# Patient Record
Sex: MALE | Race: OTHER | NOT HISPANIC OR LATINO | URBAN - METROPOLITAN AREA
[De-identification: names, ages, dates, MRNs, and addresses within clinical notes are randomized per-mention and may not be internally consistent; named-entity substitution may affect disease eponyms.]

---

## 2020-09-21 ENCOUNTER — EMERGENCY (EMERGENCY)
Facility: HOSPITAL | Age: 65
LOS: 1 days | Discharge: HOME | End: 2020-09-21
Attending: EMERGENCY MEDICINE
Payer: MEDICARE

## 2020-09-21 VITALS
HEIGHT: 68.5 IN | DIASTOLIC BLOOD PRESSURE: 80 MMHG | HEART RATE: 70 BPM | OXYGEN SATURATION: 97 % | RESPIRATION RATE: 16 BRPM | WEIGHT: 206.13 LBS | SYSTOLIC BLOOD PRESSURE: 118 MMHG | TEMPERATURE: 98 F

## 2020-09-21 VITALS
RESPIRATION RATE: 17 BRPM | DIASTOLIC BLOOD PRESSURE: 64 MMHG | SYSTOLIC BLOOD PRESSURE: 126 MMHG | TEMPERATURE: 98 F | HEART RATE: 57 BPM | OXYGEN SATURATION: 97 %

## 2020-09-21 LAB
ALBUMIN SERPL ELPH-MCNC: 3.7 G/DL — SIGNIFICANT CHANGE UP (ref 3.5–5)
ALP SERPL-CCNC: 48 U/L — SIGNIFICANT CHANGE UP (ref 40–120)
ALT FLD-CCNC: 26 U/L DA — SIGNIFICANT CHANGE UP (ref 10–60)
ANION GAP SERPL CALC-SCNC: 6 MMOL/L — SIGNIFICANT CHANGE UP (ref 5–17)
AST SERPL-CCNC: 23 U/L — SIGNIFICANT CHANGE UP (ref 10–40)
BASOPHILS # BLD AUTO: 0.04 K/UL — SIGNIFICANT CHANGE UP (ref 0–0.2)
BASOPHILS NFR BLD AUTO: 0.5 % — SIGNIFICANT CHANGE UP (ref 0–2)
BILIRUB SERPL-MCNC: 0.7 MG/DL — SIGNIFICANT CHANGE UP (ref 0.2–1.2)
BUN SERPL-MCNC: 25 MG/DL — HIGH (ref 7–18)
CALCIUM SERPL-MCNC: 9.4 MG/DL — SIGNIFICANT CHANGE UP (ref 8.4–10.5)
CHLORIDE SERPL-SCNC: 111 MMOL/L — HIGH (ref 96–108)
CO2 SERPL-SCNC: 25 MMOL/L — SIGNIFICANT CHANGE UP (ref 22–31)
CREAT SERPL-MCNC: 1.84 MG/DL — HIGH (ref 0.5–1.3)
EOSINOPHIL # BLD AUTO: 0.17 K/UL — SIGNIFICANT CHANGE UP (ref 0–0.5)
EOSINOPHIL NFR BLD AUTO: 2.3 % — SIGNIFICANT CHANGE UP (ref 0–6)
GLUCOSE SERPL-MCNC: 92 MG/DL — SIGNIFICANT CHANGE UP (ref 70–99)
HCT VFR BLD CALC: 39.6 % — SIGNIFICANT CHANGE UP (ref 39–50)
HGB BLD-MCNC: 12.9 G/DL — LOW (ref 13–17)
IMM GRANULOCYTES NFR BLD AUTO: 0.4 % — SIGNIFICANT CHANGE UP (ref 0–1.5)
LYMPHOCYTES # BLD AUTO: 2.04 K/UL — SIGNIFICANT CHANGE UP (ref 1–3.3)
LYMPHOCYTES # BLD AUTO: 27.2 % — SIGNIFICANT CHANGE UP (ref 13–44)
MAGNESIUM SERPL-MCNC: 2.3 MG/DL — SIGNIFICANT CHANGE UP (ref 1.6–2.6)
MCHC RBC-ENTMCNC: 26.9 PG — LOW (ref 27–34)
MCHC RBC-ENTMCNC: 32.6 GM/DL — SIGNIFICANT CHANGE UP (ref 32–36)
MCV RBC AUTO: 82.7 FL — SIGNIFICANT CHANGE UP (ref 80–100)
MONOCYTES # BLD AUTO: 0.63 K/UL — SIGNIFICANT CHANGE UP (ref 0–0.9)
MONOCYTES NFR BLD AUTO: 8.4 % — SIGNIFICANT CHANGE UP (ref 2–14)
NEUTROPHILS # BLD AUTO: 4.6 K/UL — SIGNIFICANT CHANGE UP (ref 1.8–7.4)
NEUTROPHILS NFR BLD AUTO: 61.2 % — SIGNIFICANT CHANGE UP (ref 43–77)
NRBC # BLD: 0 /100 WBCS — SIGNIFICANT CHANGE UP (ref 0–0)
PLATELET # BLD AUTO: 207 K/UL — SIGNIFICANT CHANGE UP (ref 150–400)
POTASSIUM SERPL-MCNC: 3.8 MMOL/L — SIGNIFICANT CHANGE UP (ref 3.5–5.3)
POTASSIUM SERPL-SCNC: 3.8 MMOL/L — SIGNIFICANT CHANGE UP (ref 3.5–5.3)
PROT SERPL-MCNC: 7.4 G/DL — SIGNIFICANT CHANGE UP (ref 6–8.3)
RBC # BLD: 4.79 M/UL — SIGNIFICANT CHANGE UP (ref 4.2–5.8)
RBC # FLD: 15 % — HIGH (ref 10.3–14.5)
SODIUM SERPL-SCNC: 142 MMOL/L — SIGNIFICANT CHANGE UP (ref 135–145)
WBC # BLD: 7.51 K/UL — SIGNIFICANT CHANGE UP (ref 3.8–10.5)
WBC # FLD AUTO: 7.51 K/UL — SIGNIFICANT CHANGE UP (ref 3.8–10.5)

## 2020-09-21 PROCEDURE — 70450 CT HEAD/BRAIN W/O DYE: CPT

## 2020-09-21 PROCEDURE — 99284 EMERGENCY DEPT VISIT MOD MDM: CPT

## 2020-09-21 PROCEDURE — 85025 COMPLETE CBC W/AUTO DIFF WBC: CPT

## 2020-09-21 PROCEDURE — 93005 ELECTROCARDIOGRAM TRACING: CPT

## 2020-09-21 PROCEDURE — 80053 COMPREHEN METABOLIC PANEL: CPT

## 2020-09-21 PROCEDURE — 99284 EMERGENCY DEPT VISIT MOD MDM: CPT | Mod: 25

## 2020-09-21 PROCEDURE — 83735 ASSAY OF MAGNESIUM: CPT

## 2020-09-21 PROCEDURE — 82962 GLUCOSE BLOOD TEST: CPT

## 2020-09-21 PROCEDURE — 36415 COLL VENOUS BLD VENIPUNCTURE: CPT

## 2020-09-21 NOTE — ED PROVIDER NOTE - CARE PROVIDER_API CALL
Chris Hunt  NEUROLOGY  [address] [facility], [facility] [address]  [place] [address]  Phone: [phone]  Fax: [phone]  Follow Up Time:

## 2020-09-21 NOTE — ED PROVIDER NOTE - CLINICAL SUMMARY MEDICAL DECISION MAKING FREE TEXT BOX
63 y/o male with chronic speech stuttering and upper extremity tremors sent by Dr. Bell. Will check labs and check with Dr. Bell.

## 2020-09-21 NOTE — ED PROVIDER NOTE - NSFOLLOWUPINSTRUCTIONS_ED_ALL_ED_FT
Followup with your PMD or neurology specialist above.  Return to ED if you develop seizures/lose consciousness or develop severe shaking.      Tremors    WHAT YOU NEED TO KNOW:    A tremor is a movement you cannot control that occurs in a rhythm. Tremors most commonly occur in the hands. Other common places include the head or face, trunk, or legs. Your voice can also have a tremor and sound shaky when you speak. A tremor may be caused by a nerve problem, too much thyroid hormone, or by certain medicines, caffeine, or alcohol. Tremors may be temporary or permanent. The tremor may go away and return, or worsen with stress. Tremors can happen at any age, but they are more common in later years.    DISCHARGE INSTRUCTIONS:    Contact your healthcare provider if:   •You have a new or worsening tremor.      •You have tremors that make it difficult to do your regular activities.      •You have questions or concerns about your condition or care.      Medicines:   •Medicines may be used to help control some kinds of tremors.      •Take your medicine as directed. Contact your healthcare provider if you think your medicine is not helping or if you have side effects. Tell him or her if you are allergic to any medicine. Keep a list of the medicines, vitamins, and herbs you take. Include the amounts, and when and why you take them. Bring the list or the pill bottles to follow-up visits. Carry your medicine list with you in case of an emergency.      Manage your symptoms:   •Do not have caffeine or other chemicals that affect your nerves. Limit or do not have caffeine. Caffeine can make tremors worse. Talk to your healthcare provider about herbal medicines, teas, and supplements. They may also increase tremors. Do not use illegal drugs.      •Go to physical and occupational therapy as directed. A physical therapist can teach you exercises to help reduce the tremor and improve muscle control. You may be shown how to hold the body part during a tremor to help control the movement. The therapist can also help you build strength and balance. An occupational therapist can show you how to use adaptive devices to help you move more easily.       •Use objects that will help you control movements. You may have more hand control if you add a watch or bracelet to your wrist. It may be easier for you to drink from a straw, or to fill your cup only half full. Cups with lids, such as travel mugs, can also help you drink with more control. Heavy eating utensils can help you eat more easily. A button fastener can help you button clothing if tremors in your hands make this difficult.      •Set a regular sleep schedule. Lack of sleep can make tremors worse. Try to go to bed at the same time each night and wake up at the same time each morning.      Follow up with your healthcare provider as directed: Write down your questions so you remember to ask them during your visits.

## 2020-09-21 NOTE — ED ADULT TRIAGE NOTE - CHIEF COMPLAINT QUOTE
I have stuttering for 2 weeks and left hand tremor x 3 years now. Patient has no comprehension deficit.

## 2020-09-21 NOTE — ED ADULT NURSE NOTE - OBJECTIVE STATEMENT
Pt states "I have stuttering for 2 weeks and left hand tremor x 3 years now". Patient has no comprehension deficit. Pt resting in bed, A&OX3, no facial droop noted. Pt denies chest pain, no shortness of breath indicated.

## 2020-09-21 NOTE — ED PROVIDER NOTE - PROGRESS NOTE DETAILS
D/w PMD Dr. Bell and he requests for Pt to have CT head.  He says that if CT head unremarkable, Pt may be discharged home to f/u him and neurology outpatient. patient signeodut to me by Dr. Beasley at 11pm. awaiting CT head  CT head unremarkable. Discussed above with patient. patient stable for discharge to followup with neurology as outpatient. EMILIA Badillo MD

## 2020-09-21 NOTE — ED ADULT TRIAGE NOTE - ARRIVAL INFO ADDITIONAL COMMENTS
Coherent, stuttering speech, left facial droop, no arm drift, left arm tremor, no sensory deficit, ambulates with steady gait, denies swallowing deficit

## 2020-09-21 NOTE — ED ADULT NURSE NOTE - ED STAT RN HANDOFF DETAILS
Report given to LENARD Crocker. Pt resting in bed. No acute distress noted, no shortness of breath indicated. Safety maintained.

## 2020-09-21 NOTE — ED PROVIDER NOTE - OBJECTIVE STATEMENT
65 y/o male h/o HTN, DM, and chronic back pain s/p trauma, coming in for 2 weeks of speech stuttering and 3 years of b/l upper extremity tremors, worse on left than right. States he saw PCP Dr. Olmstead earlier today who reportedly advised him to come to the ED for further evaluation. Denies any NV, chest pain, shortness of breath, fever, visual changes, syncope, covid sx, or any other pain.

## 2020-09-21 NOTE — ED PROVIDER NOTE - NSFOLLOWUPCLINICS_GEN_ALL_ED_FT
[facility]  Neurology  [address] [place] [address]  Phone: [phone]  Fax: [phone]  Follow Up Time:

## 2020-09-22 PROCEDURE — 70450 CT HEAD/BRAIN W/O DYE: CPT | Mod: 26

## 2020-09-24 ENCOUNTER — APPOINTMENT (OUTPATIENT)
Dept: NEUROLOGY | Facility: CLINIC | Age: 65
End: 2020-09-24
Payer: MEDICARE

## 2020-09-24 VITALS
HEIGHT: 72 IN | OXYGEN SATURATION: 97 % | WEIGHT: 208 LBS | HEART RATE: 69 BPM | BODY MASS INDEX: 28.17 KG/M2 | SYSTOLIC BLOOD PRESSURE: 121 MMHG | DIASTOLIC BLOOD PRESSURE: 74 MMHG

## 2020-09-24 DIAGNOSIS — Z78.9 OTHER SPECIFIED HEALTH STATUS: ICD-10-CM

## 2020-09-24 DIAGNOSIS — Z86.39 PERSONAL HISTORY OF OTHER ENDOCRINE, NUTRITIONAL AND METABOLIC DISEASE: ICD-10-CM

## 2020-09-24 DIAGNOSIS — I63.20: ICD-10-CM

## 2020-09-24 DIAGNOSIS — Z82.49 FAMILY HISTORY OF ISCHEMIC HEART DISEASE AND OTHER DISEASES OF THE CIRCULATORY SYSTEM: ICD-10-CM

## 2020-09-24 DIAGNOSIS — Z83.3 FAMILY HISTORY OF DIABETES MELLITUS: ICD-10-CM

## 2020-09-24 DIAGNOSIS — Z86.79 PERSONAL HISTORY OF OTHER DISEASES OF THE CIRCULATORY SYSTEM: ICD-10-CM

## 2020-09-24 DIAGNOSIS — G20 PARKINSON'S DISEASE: ICD-10-CM

## 2020-09-24 PROBLEM — Z00.00 ENCOUNTER FOR PREVENTIVE HEALTH EXAMINATION: Status: ACTIVE | Noted: 2020-09-24

## 2020-09-24 PROCEDURE — 99203 OFFICE O/P NEW LOW 30 MIN: CPT

## 2020-09-24 RX ORDER — REPAGLINIDE 2 MG/1
2 TABLET ORAL
Refills: 0 | Status: ACTIVE | COMMUNITY

## 2020-09-24 RX ORDER — SIMVASTATIN 20 MG/1
20 TABLET, FILM COATED ORAL
Refills: 0 | Status: ACTIVE | COMMUNITY

## 2020-09-24 RX ORDER — LABETALOL HYDROCHLORIDE 100 MG/1
100 TABLET, FILM COATED ORAL
Refills: 0 | Status: ACTIVE | COMMUNITY

## 2020-09-24 RX ORDER — FENOFIBRATE 48 MG/1
48 TABLET ORAL
Refills: 0 | Status: ACTIVE | COMMUNITY

## 2020-09-24 RX ORDER — FOSINOPRIL SODIUM 40 MG/1
40 TABLET ORAL
Refills: 0 | Status: ACTIVE | COMMUNITY

## 2020-09-24 RX ORDER — AMLODIPINE BESYLATE 5 MG/1
5 TABLET ORAL
Refills: 0 | Status: ACTIVE | COMMUNITY

## 2020-09-24 RX ORDER — GLIMEPIRIDE 1 MG/1
1 TABLET ORAL
Refills: 0 | Status: ACTIVE | COMMUNITY

## 2020-09-24 RX ORDER — HYDROCHLOROTHIAZIDE 12.5 MG/1
12.5 CAPSULE ORAL
Refills: 0 | Status: ACTIVE | COMMUNITY

## 2020-09-24 RX ORDER — ISOSORBIDE MONONITRATE 30 MG/1
30 TABLET, EXTENDED RELEASE ORAL
Refills: 0 | Status: ACTIVE | COMMUNITY

## 2020-09-24 RX ORDER — SITAGLIPTIN 100 MG/1
100 TABLET, FILM COATED ORAL
Refills: 0 | Status: ACTIVE | COMMUNITY

## 2020-09-24 NOTE — REVIEW OF SYSTEMS
[Hand Weakness] :  hand weakness [Poor Coordination] : poor coordination [Difficulty Writing] : difficulty writing [Difficulties in Speech] : speech difficulties [Fever] : no fever [Chills] : no chills [Feeling Poorly] : not feeling poorly [Feeling Tired] : not feeling tired [Recent Weight Gain (___ Lbs)] : no recent weight gain [Recent Weight Loss (___ Lbs)] : no recent weight loss [Suicidal] : not suicidal [Sleep Disturbances] : no sleep disturbances [Confused or Disoriented] : no confusion [Memory Lapses or Loss] : no memory loss [Decr. Concentrating Ability] : no decrease in concentrating ability [Difficulty with Language] : no ~M difficulty with language [Changed Thought Patterns] : no change in thought patterns [Repeating Questions] : no repeated questioning about recent events [Facial Weakness] : no facial weakness [Arm Weakness] : no arm weakness [Leg Weakness] : no leg weakness [Numbness] : no numbness [Tingling] : no tingling [Abnormal Sensation] : no abnormal sensation [Hypersensitivity] : no hypersensitivity [Seizures] : no convulsions [Dizziness] : no dizziness [Fainting] : no fainting [Lightheadedness] : no lightheadedness [Vertigo] : no vertigo [Migraine Headache] : no migraine headache [Difficulty Walking] : no difficulty walking [Inability to Walk] : able to walk [Ataxia] : no ataxia [Frequent Falls] : not falling [Limping] : not limping [Eye Pain] : no eye pain [Red Eyes] : eyes not red [Eyesight Problems] : no eyesight problems [Discharge From Eyes] : no purulent discharge from the eyes [Dry Eyes] : no dryness of the eyes [Eyes Itch] : no itching of the eyes

## 2020-09-24 NOTE — HISTORY OF PRESENT ILLNESS
[FreeTextEntry1] : Tremor and difficulty speaking. He developed  tremor in the left hand that impairs activities with the hand. He is left handed and the tremor occurs intermittently interrupting writing shaving buttoning and other tasks. The speech impairment described as a stammer has happened before about 3 years ago and appeared to improve spontaneously after a month. It has recurred a few days ago. He went for a CT scan two days ago

## 2020-09-24 NOTE — DISCUSSION/SUMMARY
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 It appeared to be a slightly faster frequency compared to usual PD tremor, with a large amplitude in his left hand only when it was held by his side. Quan cognitive assessment  performance was poor in only the tasks requiring language, and, because he is a native [place] speaker the value of this part of the score is questionable. 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 MRI and Janeen scan will help in resolve the diffierental

## 2020-09-24 NOTE — DATA REVIEWED
[de-identified] : EXAM: CT BRAIN \par PROCEDURE DATE: 09/22/2020 \par INTERPRETATION: CLINICAL INFORMATION: Bilateral upper extremity tremors and stuttering speech times several weeks. \par TECHNIQUE: Noncontrast axial CT images of the brain were acquired from the base of skull to vertex. \par COMPARISON: None. \par FINDINGS: No intracranial hemorrhage is seen. The grey-white differentiation is maintained. Mild periventricular and subcortical white matter hypoattenuation without mass effect is noted, non-specific, but likely related to chronic small vessel ischemic changes. \par Ventricles and sulci are normal in size and configuration for patient's age. No mass effect or midline shift is seen. Basal cisterns are not effaced. \par The visualized paranasal sinuses and tympanomastoid cavities are clear. Patient is edentulous. \par No osseous abnormality seen. \par IMPRESSION: No intracranial hemorrhage or mass effect. \par HERMES MARROQUIN M.D., ATTENDING RADIOLOGIST \par This document has been electronically signed. Sep 22 2020 1:05AM

## 2020-09-29 PROBLEM — I10 ESSENTIAL (PRIMARY) HYPERTENSION: Chronic | Status: ACTIVE | Noted: 2020-09-23

## 2020-09-29 PROBLEM — E11.9 TYPE 2 DIABETES MELLITUS WITHOUT COMPLICATIONS: Chronic | Status: ACTIVE | Noted: 2020-09-23

## 2020-09-29 PROBLEM — M54.9 DORSALGIA, UNSPECIFIED: Chronic | Status: ACTIVE | Noted: 2020-09-23

## 2020-10-03 ENCOUNTER — OUTPATIENT (OUTPATIENT)
Dept: OUTPATIENT SERVICES | Facility: HOSPITAL | Age: 65
LOS: 1 days | End: 2020-10-03

## 2020-10-03 ENCOUNTER — APPOINTMENT (OUTPATIENT)
Dept: MRI IMAGING | Facility: CLINIC | Age: 65
End: 2020-10-03
Payer: MEDICARE

## 2020-10-03 PROCEDURE — 70551 MRI BRAIN STEM W/O DYE: CPT | Mod: 26

## 2020-11-04 ENCOUNTER — APPOINTMENT (OUTPATIENT)
Dept: NUCLEAR MEDICINE | Facility: IMAGING CENTER | Age: 65
End: 2020-11-04

## 2020-11-04 ENCOUNTER — OUTPATIENT (OUTPATIENT)
Dept: OUTPATIENT SERVICES | Facility: HOSPITAL | Age: 65
LOS: 1 days | End: 2020-11-04
Payer: MEDICARE

## 2020-11-04 DIAGNOSIS — G20 PARKINSON'S DISEASE: ICD-10-CM

## 2020-11-04 DIAGNOSIS — I63.20 CEREBRAL INFARCTION DUE TO UNSPECIFIED OCCLUSION OR STENOSIS OF UNSPECIFIED PRECEREBRAL ARTERIES: ICD-10-CM

## 2020-11-04 PROCEDURE — 78803 RP LOCLZJ TUM SPECT 1 AREA: CPT | Mod: 26

## 2020-11-04 PROCEDURE — A9584: CPT

## 2020-11-04 PROCEDURE — 78803 RP LOCLZJ TUM SPECT 1 AREA: CPT

## 2020-11-19 ENCOUNTER — APPOINTMENT (OUTPATIENT)
Dept: NEUROLOGY | Facility: CLINIC | Age: 65
End: 2020-11-19
Payer: MEDICARE

## 2020-11-19 VITALS
HEART RATE: 69 BPM | WEIGHT: 208 LBS | OXYGEN SATURATION: 98 % | SYSTOLIC BLOOD PRESSURE: 117 MMHG | HEIGHT: 72 IN | TEMPERATURE: 97.6 F | BODY MASS INDEX: 28.17 KG/M2 | DIASTOLIC BLOOD PRESSURE: 65 MMHG

## 2020-11-19 DIAGNOSIS — G25.0 ESSENTIAL TREMOR: ICD-10-CM

## 2020-11-19 PROCEDURE — 99214 OFFICE O/P EST MOD 30 MIN: CPT

## 2020-12-03 NOTE — DISCUSSION/SUMMARY
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 He is encouraged to continue his current medications for control of diabetes mellitus, hyperlipidemia and hypertension.\par \par  Discussed and agreed to begin a trial of primidone for presumed essential tremor which is the diagnosis now that Parkinson's disease appears less likely (but not excluded) according to the DG scan.

## 2020-12-03 NOTE — PHYSICAL EXAM
[General Appearance - Alert] : alert [General Appearance - In No Acute Distress] : in no acute distress [Person] : oriented to person [Place] : oriented to place [Time] : oriented to time [Short Term Intact] : short term memory intact [Remote Intact] : remote memory intact [Span Intact] : the attention span was normal [Concentration Intact] : normal concentrating ability [Visual Intact] : visual attention was ~T not ~L decreased [Naming Objects] : no difficulty naming common objects [Repeating Phrases] : no difficulty repeating a phrase [Writing A Sentence] : no difficulty writing a sentence [Fluency] : fluency intact [Comprehension] : comprehension intact [Reading] : reading intact [Cranial Nerves Optic (II)] : visual acuity intact bilaterally,  visual fields full to confrontation, pupils equal round and reactive to light [Cranial Nerves Oculomotor (III)] : extraocular motion intact [Cranial Nerves Trigeminal (V)] : facial sensation intact symmetrically [Cranial Nerves Facial (VII)] : face symmetrical [Cranial Nerves Vestibulocochlear (VIII)] : hearing was intact bilaterally [Cranial Nerves Glossopharyngeal (IX)] : tongue and palate midline [Cranial Nerves Accessory (XI - Cranial And Spinal)] : head turning and shoulder shrug symmetric [Motor Tone] : muscle tone was normal in all four extremities [Motor Strength] : muscle strength was normal in all four extremities [Sensation Tactile Decrease] : light touch was intact [Vibration Decrease Leg / Foot Both Ankles] : decreased at both ankles [Vibration Decrease Leg / Foot Toes Both Feet] : decreased at the toes of both feet  [Limited Balance] : the patient's balance was impaired [2+] : Brachioradialis left 2+ [1+] : Ankle jerk left 1+ [Primitive Reflexes] : primitive reflexes were present [Sclera] : the sclera and conjunctiva were normal [PERRL With Normal Accommodation] : pupils were equal in size, round, reactive to light, with normal accommodation [Extraocular Movements] : extraocular movements were intact [Dysdiadochokinesia Bilaterally] : not present [Coordination - Dysmetria Impaired Finger-to-Nose Bilateral] : not present [Coordination - Dysmetria Impaired Heel-to-Shin Bilateral] : not present [Plantar Reflex Right Only] : normal on the right [Plantar Reflex Left Only] : normal on the left [___] : absent on the right [___] : absent on the left [FreeTextEntry1] : Resting tremor and dynamic /static slow rhythmic tremor left> right hand with stooped gait and reduced swing of the left hand

## 2020-12-03 NOTE — DATA REVIEWED
[de-identified] : EXAM: CT BRAIN \par PROCEDURE DATE: 09/22/2020 \par INTERPRETATION: CLINICAL INFORMATION: Bilateral upper extremity tremors and stuttering speech times several weeks. \par TECHNIQUE: Noncontrast axial CT images of the brain were acquired from the base of skull to vertex. \par COMPARISON: None. \par FINDINGS: No intracranial hemorrhage is seen. The grey-white differentiation is maintained. Mild periventricular and subcortical white matter hypoattenuation without mass effect is noted, non-specific, but likely related to chronic small vessel ischemic changes. \par Ventricles and sulci are normal in size and configuration for patient's age. No mass effect or midline shift is seen. Basal cisterns are not effaced. \par The visualized paranasal sinuses and tympanomastoid cavities are clear. Patient is edentulous. \par No osseous abnormality seen. \par IMPRESSION: No intracranial hemorrhage or mass effect. \par HERMES MARROQUIN M.D., ATTENDING RADIOLOGIST \par This document has been electronically signed. Sep 22 2020 1:05AM

## 2020-12-22 ENCOUNTER — APPOINTMENT (OUTPATIENT)
Dept: NEUROLOGY | Facility: CLINIC | Age: 65
End: 2020-12-22
Payer: MEDICARE

## 2020-12-22 VITALS
WEIGHT: 204 LBS | HEIGHT: 72 IN | BODY MASS INDEX: 27.63 KG/M2 | SYSTOLIC BLOOD PRESSURE: 131 MMHG | OXYGEN SATURATION: 96 % | HEART RATE: 65 BPM | DIASTOLIC BLOOD PRESSURE: 63 MMHG | TEMPERATURE: 97.9 F

## 2020-12-22 PROCEDURE — 99072 ADDL SUPL MATRL&STAF TM PHE: CPT

## 2020-12-22 PROCEDURE — 99214 OFFICE O/P EST MOD 30 MIN: CPT

## 2020-12-22 NOTE — HISTORY OF PRESENT ILLNESS
[FreeTextEntry1] : Although he reports that he is not better, he continues to take primidone 50 mg at bed time. He reports he had used primidone in the past and it had not worked.

## 2021-03-22 ENCOUNTER — APPOINTMENT (OUTPATIENT)
Dept: NEUROLOGY | Facility: CLINIC | Age: 66
End: 2021-03-22
Payer: MEDICARE

## 2021-03-22 VITALS
TEMPERATURE: 98.7 F | SYSTOLIC BLOOD PRESSURE: 139 MMHG | BODY MASS INDEX: 26.55 KG/M2 | HEIGHT: 72 IN | HEART RATE: 65 BPM | OXYGEN SATURATION: 97 % | DIASTOLIC BLOOD PRESSURE: 79 MMHG | WEIGHT: 196 LBS

## 2021-03-22 PROCEDURE — 99072 ADDL SUPL MATRL&STAF TM PHE: CPT

## 2021-03-22 PROCEDURE — 99214 OFFICE O/P EST MOD 30 MIN: CPT

## 2021-03-22 NOTE — PHYSICAL EXAM
[Person] : oriented to person [Place] : oriented to place [Time] : oriented to time [Short Term Intact] : short term memory intact [Remote Intact] : remote memory intact [Span Intact] : the attention span was normal [Concentration Intact] : normal concentrating ability [Visual Intact] : visual attention was ~T not ~L decreased [Naming Objects] : no difficulty naming common objects [Repeating Phrases] : no difficulty repeating a phrase [Writing A Sentence] : no difficulty writing a sentence [Fluency] : fluency intact [Comprehension] : comprehension intact [Reading] : reading intact [Cranial Nerves Optic (II)] : visual acuity intact bilaterally,  visual fields full to confrontation, pupils equal round and reactive to light [Cranial Nerves Oculomotor (III)] : extraocular motion intact [Cranial Nerves Trigeminal (V)] : facial sensation intact symmetrically [Cranial Nerves Facial (VII)] : face symmetrical [Cranial Nerves Vestibulocochlear (VIII)] : hearing was intact bilaterally [Cranial Nerves Glossopharyngeal (IX)] : tongue and palate midline [Cranial Nerves Accessory (XI - Cranial And Spinal)] : head turning and shoulder shrug symmetric [Motor Tone] : muscle tone was normal in all four extremities [Motor Strength] : muscle strength was normal in all four extremities [Sensation Tactile Decrease] : light touch was intact [Vibration Decrease Leg / Foot Both Ankles] : decreased at both ankles [Vibration Decrease Leg / Foot Toes Both Feet] : decreased at the toes of both feet  [Limited Balance] : the patient's balance was impaired [2+] : Brachioradialis left 2+ [1+] : Ankle jerk left 1+ [Primitive Reflexes] : primitive reflexes were present [Sclera] : the sclera and conjunctiva were normal [PERRL With Normal Accommodation] : pupils were equal in size, round, reactive to light, with normal accommodation [Extraocular Movements] : extraocular movements were intact [Dysdiadochokinesia Bilaterally] : not present [Coordination - Dysmetria Impaired Finger-to-Nose Bilateral] : not present [Coordination - Dysmetria Impaired Heel-to-Shin Bilateral] : not present [Plantar Reflex Right Only] : normal on the right [Plantar Reflex Left Only] : normal on the left [___] : absent on the right [___] : absent on the left [FreeTextEntry1] : Resting tremor and dynamic /static slow rhythmic tremor left> right hand with stooped gait and reduced swing of the left hand

## 2021-03-22 NOTE — DATA REVIEWED
[de-identified] : EXAM: NM BRAIN SPECT SA SD\par PROCEDURE DATE: 11/04/2020\par INTERPRETATION: RADIOPHARMACEUTICAL: 4.4 mCi I-123 Ioflupane, i.v.\par CLINICAL INFORMATION: 64 year-old male with movement disorder, referred to evaluate for essential tremor versus Parkinson's disease. The patient was not on any confounding medication at the time of the study.\par \par TECHNIQUE: The patient was pretreated with Lugol's solution 1 hour prior to administration of the above dose of radiopharmaceutical. SPECT imaging of the brain was performed approximately 3 hours after radiopharmaceutical injection.\par COMPARISON: No prior DaTSCAN was available for comparison.\par FINDINGS: There is symmetric activity in the caudate nuclei and putamina bilaterally.\par IMPRESSION: Normal DaTScan. No radionuclide evidence of dopaminergic transporter depletion. Findings do not support the diagnosis of Parkinson's disease or a Parkinson's syndrome.\par DENISE GUERRERO MD; Attending Nuclear Medicine\par This document has been electronically signed. Nov 4 2020 4:46PM\par \par

## 2021-03-22 NOTE — DISCUSSION/SUMMARY
[FreeTextEntry1] : Improved tremor; microvascular ischemic changes on MRI and normal DG scan;\par continue current dose of primidone that appears to be helping the tremor. Follow up after 1 year.

## 2021-03-22 NOTE — HISTORY OF PRESENT ILLNESS
[FreeTextEntry1] : Feels tremor is well- controlled he rates the shaking at its worst to 3/5 but admits it is > 50% better. Has a little shaking using forks an d knife and with bringing a cup of liquid to drink to his mouth, but fully independent with dressing laces shoes.

## 2022-03-22 ENCOUNTER — APPOINTMENT (OUTPATIENT)
Dept: NEUROLOGY | Facility: CLINIC | Age: 67
End: 2022-03-22
Payer: MEDICARE

## 2022-03-22 VITALS
HEART RATE: 67 BPM | BODY MASS INDEX: 26.55 KG/M2 | DIASTOLIC BLOOD PRESSURE: 69 MMHG | HEIGHT: 72 IN | OXYGEN SATURATION: 97 % | TEMPERATURE: 98.6 F | WEIGHT: 196 LBS | SYSTOLIC BLOOD PRESSURE: 122 MMHG

## 2022-03-22 DIAGNOSIS — G90.9 DISORDER OF THE AUTONOMIC NERVOUS SYSTEM, UNSPECIFIED: ICD-10-CM

## 2022-03-22 PROCEDURE — 95922 AUTONOMIC NRV ADRENRG INERVJ: CPT | Mod: 1L

## 2022-03-22 PROCEDURE — 99214 OFFICE O/P EST MOD 30 MIN: CPT

## 2022-04-14 ENCOUNTER — APPOINTMENT (OUTPATIENT)
Dept: NEUROLOGY | Facility: CLINIC | Age: 67
End: 2022-04-14

## 2022-04-14 VITALS
BODY MASS INDEX: 27.09 KG/M2 | WEIGHT: 200 LBS | SYSTOLIC BLOOD PRESSURE: 121 MMHG | HEART RATE: 66 BPM | OXYGEN SATURATION: 97 % | DIASTOLIC BLOOD PRESSURE: 72 MMHG | TEMPERATURE: 97.2 F | HEIGHT: 72 IN

## 2022-04-14 VITALS — HEIGHT: 70 IN | BODY MASS INDEX: 28.7 KG/M2

## 2022-04-14 PROCEDURE — 95923 AUTONOMIC NRV SYST FUNJ TEST: CPT

## 2022-04-14 PROCEDURE — 93922 UPR/L XTREMITY ART 2 LEVELS: CPT

## 2022-04-14 PROCEDURE — 95921 AUTONOMIC NRV PARASYM INERVJ: CPT

## 2022-04-17 NOTE — REVIEW OF SYSTEMS
[Hand Weakness] :  hand weakness [Poor Coordination] : poor coordination [Difficulty Writing] : difficulty writing [Difficulties in Speech] : speech difficulties [Negative] : Heme/Lymph [Fever] : no fever [Chills] : no chills [Feeling Poorly] : not feeling poorly [Feeling Tired] : not feeling tired [Recent Weight Gain (___ Lbs)] : no recent weight gain [Recent Weight Loss (___ Lbs)] : no recent weight loss [Suicidal] : not suicidal [Sleep Disturbances] : no sleep disturbances [Confused or Disoriented] : no confusion [Memory Lapses or Loss] : no memory loss [Decr. Concentrating Ability] : no decrease in concentrating ability [Difficulty with Language] : no ~M difficulty with language [Changed Thought Patterns] : no change in thought patterns [Repeating Questions] : no repeated questioning about recent events [Facial Weakness] : no facial weakness [Arm Weakness] : no arm weakness [Leg Weakness] : no leg weakness [Numbness] : no numbness [Tingling] : no tingling [Abnormal Sensation] : no abnormal sensation [Hypersensitivity] : no hypersensitivity [Seizures] : no convulsions [Dizziness] : no dizziness [Fainting] : no fainting [Lightheadedness] : no lightheadedness [Vertigo] : no vertigo [Migraine Headache] : no migraine headache [Difficulty Walking] : no difficulty walking [Inability to Walk] : able to walk [Ataxia] : no ataxia [Frequent Falls] : not falling [Limping] : not limping [Eye Pain] : no eye pain [Red Eyes] : eyes not red [Eyesight Problems] : no eyesight problems [Discharge From Eyes] : no purulent discharge from the eyes [Dry Eyes] : no dryness of the eyes [Eyes Itch] : no itching of the eyes

## 2022-04-17 NOTE — DATA REVIEWED
[de-identified] :  EXAM: NM BRAIN SPECT SA SD   PROCEDURE DATE: 11/04/2020    INTERPRETATION: RADIOPHARMACEUTICAL: 4.4 mCi I-123 Ioflupane, i.v.  CLINICAL INFORMATION: 64 year-old male with movement disorder, referred to evaluate for essential tremor versus Parkinson's disease. The patient was not on any confounding medication at the time of the study.  TECHNIQUE: The patient was pretreated with Lugol's solution 1 hour prior to administration of the above dose of radiopharmaceutical. SPECT imaging of the brain was performed approximately 3 hours after radiopharmaceutical injection.  COMPARISON: No prior DaTSCAN was available for comparison.  FINDINGS: There is symmetric activity in the caudate nuclei and putamina bilaterally.  IMPRESSION: Normal DaTScan. No radionuclide evidence of dopaminergic transporter depletion. Findings do not support the diagnosis of Parkinson's disease or a Parkinson's syndrome.       DENISE GUERRERO MD; Attending Nuclear Medicine This document has been electronically signed. Nov 4 2020 4:46PM

## 2022-04-17 NOTE — DISCUSSION/SUMMARY
[FreeTextEntry1] : Stop primidone as the dose recommended has not made any difference in his tremor. Tremor is most prominent holding objects between thumb and fingers, palm facing inferiorly and shoulder abducted with elbows flexed bringing hand close to the mouth. Measured frequency mean 5.8 Hz; X=6 rad/s Y=1.8 rad/s Z=2.1 rad/s;\par trial of gabapentin which will also help depression /mood stabilization and tremor

## 2022-04-17 NOTE — PHYSICAL EXAM
[General Appearance - Alert] : alert [General Appearance - In No Acute Distress] : in no acute distress [Person] : oriented to person [Place] : oriented to place [Time] : oriented to time [Short Term Intact] : short term memory intact [Remote Intact] : remote memory intact [Span Intact] : the attention span was normal [Concentration Intact] : normal concentrating ability [Visual Intact] : visual attention was ~T not ~L decreased [Naming Objects] : no difficulty naming common objects [Repeating Phrases] : no difficulty repeating a phrase [Writing A Sentence] : no difficulty writing a sentence [Fluency] : fluency intact [Comprehension] : comprehension intact [Reading] : reading intact [Cranial Nerves Optic (II)] : visual acuity intact bilaterally,  visual fields full to confrontation, pupils equal round and reactive to light [Cranial Nerves Oculomotor (III)] : extraocular motion intact [Cranial Nerves Trigeminal (V)] : facial sensation intact symmetrically [Cranial Nerves Vestibulocochlear (VIII)] : hearing was intact bilaterally [Cranial Nerves Facial (VII)] : face symmetrical [Cranial Nerves Glossopharyngeal (IX)] : tongue and palate midline [Cranial Nerves Accessory (XI - Cranial And Spinal)] : head turning and shoulder shrug symmetric [Motor Tone] : muscle tone was normal in all four extremities [Motor Strength] : muscle strength was normal in all four extremities [Vibration Decrease Leg / Foot Both Ankles] : decreased at both ankles [Sensation Tactile Decrease] : light touch was intact [Vibration Decrease Leg / Foot Toes Both Feet] : decreased at the toes of both feet  [Limited Balance] : the patient's balance was impaired [2+] : Brachioradialis left 2+ [1+] : Ankle jerk left 1+ [Primitive Reflexes] : primitive reflexes were present [Sclera] : the sclera and conjunctiva were normal [PERRL With Normal Accommodation] : pupils were equal in size, round, reactive to light, with normal accommodation [Extraocular Movements] : extraocular movements were intact [Dysdiadochokinesia Bilaterally] : not present [Coordination - Dysmetria Impaired Finger-to-Nose Bilateral] : not present [Coordination - Dysmetria Impaired Heel-to-Shin Bilateral] : not present [Plantar Reflex Right Only] : normal on the right [Plantar Reflex Left Only] : normal on the left [___] : absent on the right [___] : absent on the left [FreeTextEntry1] : Resting tremor and dynamic /static slow rhythmic tremor left> right hand with stooped gait and reduced swing of the left hand

## 2022-07-27 ENCOUNTER — EMERGENCY (EMERGENCY)
Facility: HOSPITAL | Age: 67
LOS: 1 days | Discharge: HOME | End: 2022-07-27
Attending: EMERGENCY MEDICINE
Payer: MEDICARE

## 2022-07-27 VITALS
HEIGHT: 68.5 IN | HEART RATE: 107 BPM | OXYGEN SATURATION: 98 % | RESPIRATION RATE: 16 BRPM | SYSTOLIC BLOOD PRESSURE: 134 MMHG | TEMPERATURE: 98 F | DIASTOLIC BLOOD PRESSURE: 86 MMHG | WEIGHT: 202.83 LBS

## 2022-07-27 VITALS
TEMPERATURE: 98 F | OXYGEN SATURATION: 98 % | SYSTOLIC BLOOD PRESSURE: 130 MMHG | DIASTOLIC BLOOD PRESSURE: 84 MMHG | HEART RATE: 100 BPM | RESPIRATION RATE: 18 BRPM

## 2022-07-27 LAB
ALBUMIN SERPL ELPH-MCNC: 3.6 G/DL — SIGNIFICANT CHANGE UP (ref 3.5–5)
ALP SERPL-CCNC: 44 U/L — SIGNIFICANT CHANGE UP (ref 40–120)
ALT FLD-CCNC: 32 U/L DA — SIGNIFICANT CHANGE UP (ref 10–60)
ANION GAP SERPL CALC-SCNC: 6 MMOL/L — SIGNIFICANT CHANGE UP (ref 5–17)
AST SERPL-CCNC: 21 U/L — SIGNIFICANT CHANGE UP (ref 10–40)
BASOPHILS # BLD AUTO: 0.06 K/UL — SIGNIFICANT CHANGE UP (ref 0–0.2)
BASOPHILS NFR BLD AUTO: 1 % — SIGNIFICANT CHANGE UP (ref 0–2)
BILIRUB SERPL-MCNC: 0.8 MG/DL — SIGNIFICANT CHANGE UP (ref 0.2–1.2)
BUN SERPL-MCNC: 30 MG/DL — HIGH (ref 7–18)
CALCIUM SERPL-MCNC: 9.6 MG/DL — SIGNIFICANT CHANGE UP (ref 8.4–10.5)
CHLORIDE SERPL-SCNC: 110 MMOL/L — HIGH (ref 96–108)
CO2 SERPL-SCNC: 25 MMOL/L — SIGNIFICANT CHANGE UP (ref 22–31)
CREAT SERPL-MCNC: 1.76 MG/DL — HIGH (ref 0.5–1.3)
EGFR: 42 ML/MIN/1.73M2 — LOW
EOSINOPHIL # BLD AUTO: 0.18 K/UL — SIGNIFICANT CHANGE UP (ref 0–0.5)
EOSINOPHIL NFR BLD AUTO: 3 % — SIGNIFICANT CHANGE UP (ref 0–6)
GLUCOSE SERPL-MCNC: 166 MG/DL — HIGH (ref 70–99)
HCT VFR BLD CALC: 42.1 % — SIGNIFICANT CHANGE UP (ref 39–50)
HGB BLD-MCNC: 13.5 G/DL — SIGNIFICANT CHANGE UP (ref 13–17)
IMM GRANULOCYTES NFR BLD AUTO: 0.7 % — SIGNIFICANT CHANGE UP (ref 0–1.5)
LYMPHOCYTES # BLD AUTO: 1.39 K/UL — SIGNIFICANT CHANGE UP (ref 1–3.3)
LYMPHOCYTES # BLD AUTO: 23.1 % — SIGNIFICANT CHANGE UP (ref 13–44)
MCHC RBC-ENTMCNC: 25.9 PG — LOW (ref 27–34)
MCHC RBC-ENTMCNC: 32.1 GM/DL — SIGNIFICANT CHANGE UP (ref 32–36)
MCV RBC AUTO: 80.8 FL — SIGNIFICANT CHANGE UP (ref 80–100)
MONOCYTES # BLD AUTO: 0.65 K/UL — SIGNIFICANT CHANGE UP (ref 0–0.9)
MONOCYTES NFR BLD AUTO: 10.8 % — SIGNIFICANT CHANGE UP (ref 2–14)
NEUTROPHILS # BLD AUTO: 3.7 K/UL — SIGNIFICANT CHANGE UP (ref 1.8–7.4)
NEUTROPHILS NFR BLD AUTO: 61.4 % — SIGNIFICANT CHANGE UP (ref 43–77)
NRBC # BLD: 0 /100 WBCS — SIGNIFICANT CHANGE UP (ref 0–0)
PLATELET # BLD AUTO: 168 K/UL — SIGNIFICANT CHANGE UP (ref 150–400)
POTASSIUM SERPL-MCNC: 4.4 MMOL/L — SIGNIFICANT CHANGE UP (ref 3.5–5.3)
POTASSIUM SERPL-SCNC: 4.4 MMOL/L — SIGNIFICANT CHANGE UP (ref 3.5–5.3)
PROT SERPL-MCNC: 7.1 G/DL — SIGNIFICANT CHANGE UP (ref 6–8.3)
RBC # BLD: 5.21 M/UL — SIGNIFICANT CHANGE UP (ref 4.2–5.8)
RBC # FLD: 14.9 % — HIGH (ref 10.3–14.5)
SODIUM SERPL-SCNC: 141 MMOL/L — SIGNIFICANT CHANGE UP (ref 135–145)
WBC # BLD: 6.02 K/UL — SIGNIFICANT CHANGE UP (ref 3.8–10.5)
WBC # FLD AUTO: 6.02 K/UL — SIGNIFICANT CHANGE UP (ref 3.8–10.5)

## 2022-07-27 PROCEDURE — 99284 EMERGENCY DEPT VISIT MOD MDM: CPT

## 2022-07-27 PROCEDURE — 85025 COMPLETE CBC W/AUTO DIFF WBC: CPT

## 2022-07-27 PROCEDURE — 80053 COMPREHEN METABOLIC PANEL: CPT

## 2022-07-27 PROCEDURE — 82962 GLUCOSE BLOOD TEST: CPT

## 2022-07-27 PROCEDURE — 99284 EMERGENCY DEPT VISIT MOD MDM: CPT | Mod: 25

## 2022-07-27 PROCEDURE — 70450 CT HEAD/BRAIN W/O DYE: CPT | Mod: 26,MA

## 2022-07-27 PROCEDURE — 36415 COLL VENOUS BLD VENIPUNCTURE: CPT

## 2022-07-27 PROCEDURE — 70450 CT HEAD/BRAIN W/O DYE: CPT | Mod: MA

## 2022-07-27 NOTE — ED PROVIDER NOTE - CLINICAL SUMMARY MEDICAL DECISION MAKING FREE TEXT BOX
Patient with Parkinson's. Don't suspect CVA. Will do basic blood work for electrolyte abnormalities and get non-con head CT.

## 2022-07-27 NOTE — ED ADULT NURSE NOTE - NSIMPLEMENTINTERV_GEN_ALL_ED
Implemented All Fall with Harm Risk Interventions:  [place] to call system. Call bell, personal items and telephone within reach. Instruct patient to call for assistance. Room bathroom lighting operational. Non-slip footwear when patient is off stretcher. Physically safe environment: no spills, clutter or unnecessary equipment. Stretcher in lowest position, wheels locked, appropriate side rails in place. Provide visual cue, wrist band, yellow gown, etc. Monitor gait and stability. Monitor for mental status changes and reorient to person, place, and time. Review medications for side effects contributing to fall risk. Reinforce activity limits and safety measures with patient and family. Provide visual clues: red socks.

## 2022-07-27 NOTE — ED PROVIDER NOTE - PROGRESS NOTE DETAILS
Spoke with Dr. Mcconnell. Advised outpatient MRI by the end of the week and outpatient follow up if everything is normal. frances: pt without any neruological deficit. still having resting tremor. ct head neg.  dx parkinsons. see dr gooden for outpatient MRI. return if any acute changes.

## 2022-07-27 NOTE — ED ADULT TRIAGE NOTE - IDEAL BODY WEIGHT(KG)
I reviewed the progress note and agree with the resident’s findings and plan as documented in current encounter.    Maggie Moffett MD       68

## 2022-07-27 NOTE — ED ADULT NURSE NOTE - OBJECTIVE STATEMENT
Pt. BIBA for slurred speech that started around 3 pm. Pt. stated he "felt off". Pt. has Parkinson's and stated this happened to him before. Pt. c/o numbness and tremors to feet.

## 2022-07-27 NOTE — ED PROVIDER NOTE - PATIENT PORTAL LINK FT
You can access the FollowMyHealth Patient Portal offered by [facility] by registering at the following website: [email or web address]. By joining [facility] [facility]’s FollowMyHealth portal, you will also be able to view your health information using other applications (apps) compatible with our system.

## 2022-07-27 NOTE — ED PROVIDER NOTE - OBJECTIVE STATEMENT
66 year old male with a PHMx of HTN, HLD, ?parkinsons and last MRI was done last year with Dr. Mcconnell presents to the ED with complaints of slurred speech. Patient was talking with a  and  noticed positive slurred speech at 15:00. States the symptoms happened in the past. Also complains of numbness to the feet and resting tremors. Reports he woke up feeling ok. Denies syncope, visual changes, chest pain, focal weakness.   NKDA.

## 2022-07-27 NOTE — ED PROVIDER NOTE - NSICDXPASTMEDICALHX_GEN_ALL_CORE_FT
PAST MEDICAL HISTORY:  Back pain, chronic     DM (diabetes mellitus)     HTN (hypertension)       HLD (hyperlipidemia)

## 2022-08-02 ENCOUNTER — APPOINTMENT (OUTPATIENT)
Dept: NEUROLOGY | Facility: CLINIC | Age: 67
End: 2022-08-02

## 2022-08-02 VITALS
SYSTOLIC BLOOD PRESSURE: 109 MMHG | TEMPERATURE: 95.6 F | DIASTOLIC BLOOD PRESSURE: 60 MMHG | OXYGEN SATURATION: 99 % | HEIGHT: 70 IN | WEIGHT: 204 LBS | HEART RATE: 72 BPM | BODY MASS INDEX: 29.2 KG/M2

## 2022-08-02 PROCEDURE — 99215 OFFICE O/P EST HI 40 MIN: CPT

## 2022-08-03 PROBLEM — E78.5 HYPERLIPIDEMIA, UNSPECIFIED: Chronic | Status: ACTIVE | Noted: 2022-07-27

## 2022-11-15 ENCOUNTER — APPOINTMENT (OUTPATIENT)
Dept: NEUROLOGY | Facility: CLINIC | Age: 67
End: 2022-11-15

## 2022-11-15 VITALS
BODY MASS INDEX: 29.2 KG/M2 | TEMPERATURE: 95.6 F | HEART RATE: 61 BPM | DIASTOLIC BLOOD PRESSURE: 66 MMHG | SYSTOLIC BLOOD PRESSURE: 115 MMHG | OXYGEN SATURATION: 96 % | WEIGHT: 204 LBS | HEIGHT: 70 IN

## 2022-11-15 DIAGNOSIS — M54.2 CERVICALGIA: ICD-10-CM

## 2022-11-15 PROCEDURE — 99214 OFFICE O/P EST MOD 30 MIN: CPT

## 2022-11-15 RX ORDER — GABAPENTIN 100 MG/1
100 CAPSULE ORAL 3 TIMES DAILY
Qty: 180 | Refills: 5 | Status: DISCONTINUED | COMMUNITY
Start: 2022-03-22 | End: 2022-11-15

## 2022-11-15 RX ORDER — PRIMIDONE 50 MG/1
50 TABLET ORAL TWICE DAILY
Qty: 360 | Refills: 3 | Status: ACTIVE | COMMUNITY
Start: 2022-11-15 | End: 1900-01-01

## 2022-12-02 NOTE — REVIEW OF SYSTEMS
[Fever] : no fever [Chills] : no chills [Feeling Poorly] : not feeling poorly [Feeling Tired] : not feeling tired [Recent Weight Gain (___ Lbs)] : no recent weight gain [Recent Weight Loss (___ Lbs)] : no recent weight loss [Suicidal] : not suicidal [Sleep Disturbances] : no sleep disturbances [Confused or Disoriented] : no confusion [Memory Lapses or Loss] : no memory loss [Decr. Concentrating Ability] : no decrease in concentrating ability [Difficulty with Language] : no ~M difficulty with language [Changed Thought Patterns] : no change in thought patterns [Repeating Questions] : no repeated questioning about recent events [Facial Weakness] : no facial weakness [Arm Weakness] : no arm weakness [Leg Weakness] : no leg weakness [Numbness] : no numbness [Tingling] : no tingling [Abnormal Sensation] : no abnormal sensation [Hypersensitivity] : no hypersensitivity [Seizures] : no convulsions [Dizziness] : no dizziness [Fainting] : no fainting [Lightheadedness] : no lightheadedness [Vertigo] : no vertigo [Migraine Headache] : no migraine headache [Difficulty Walking] : no difficulty walking [Inability to Walk] : able to walk [Ataxia] : no ataxia [Frequent Falls] : not falling [Limping] : not limping [Eye Pain] : no eye pain [Red Eyes] : eyes not red [Eyesight Problems] : no eyesight problems [Discharge From Eyes] : no purulent discharge from the eyes [Dry Eyes] : no dryness of the eyes [Eyes Itch] : no itching of the eyes

## 2022-12-02 NOTE — DISCUSSION/SUMMARY
[FreeTextEntry1] : Based on the drawing of the spirals and observation his condition is worsened compared to when he was using primidone.  Nevertheless he would like to try the full dose of gabapentin and I encouraged him to go up to 300 mg 3 times a day and return for follow-up after 6 months.  Tremor rating scale was recorded.  I explained that the autonomic testing showed no autonomic disorder, which excludes multisystem disorder or Parkinson's plus disorder with autonomic neuropathy

## 2022-12-02 NOTE — PHYSICAL EXAM
[Dysdiadochokinesia Bilaterally] : not present [Coordination - Dysmetria Impaired Finger-to-Nose Bilateral] : not present [Coordination - Dysmetria Impaired Heel-to-Shin Bilateral] : not present [Plantar Reflex Right Only] : normal on the right [Plantar Reflex Left Only] : normal on the left [___] : absent on the right [___] : absent on the left [FreeTextEntry1] : Resting tremor and dynamic /static slow rhythmic tremor left> right hand with stooped gait and reduced swing of the left hand [General Appearance - Alert] : alert [General Appearance - In No Acute Distress] : in no acute distress [Person] : oriented to person [Place] : oriented to place [Time] : oriented to time [Short Term Intact] : short term memory intact [Remote Intact] : remote memory intact [Span Intact] : the attention span was normal [Concentration Intact] : normal concentrating ability [Visual Intact] : visual attention was ~T not ~L decreased [Naming Objects] : no difficulty naming common objects [Repeating Phrases] : no difficulty repeating a phrase [Writing A Sentence] : no difficulty writing a sentence [Fluency] : fluency intact [Comprehension] : comprehension intact [Reading] : reading intact [Cranial Nerves Optic (II)] : visual acuity intact bilaterally,  visual fields full to confrontation, pupils equal round and reactive to light [Cranial Nerves Oculomotor (III)] : extraocular motion intact [Cranial Nerves Trigeminal (V)] : facial sensation intact symmetrically [Cranial Nerves Facial (VII)] : face symmetrical [Cranial Nerves Vestibulocochlear (VIII)] : hearing was intact bilaterally [Cranial Nerves Glossopharyngeal (IX)] : tongue and palate midline [Cranial Nerves Accessory (XI - Cranial And Spinal)] : head turning and shoulder shrug symmetric [Motor Tone] : muscle tone was normal in all four extremities [Motor Strength] : muscle strength was normal in all four extremities [Sensation Tactile Decrease] : light touch was intact [Vibration Decrease Leg / Foot Both Ankles] : decreased at both ankles [Vibration Decrease Leg / Foot Toes Both Feet] : decreased at the toes of both feet  [Limited Balance] : the patient's balance was impaired [2+] : Brachioradialis left 2+ [1+] : Ankle jerk left 1+ [Primitive Reflexes] : primitive reflexes were present [Sclera] : the sclera and conjunctiva were normal [PERRL With Normal Accommodation] : pupils were equal in size, round, reactive to light, with normal accommodation [Extraocular Movements] : extraocular movements were intact

## 2022-12-02 NOTE — DISCUSSION/SUMMARY
[FreeTextEntry1] : Agree to resume primidone 50 mg x 2  tablets twice a day, increasing gradually by 50 mg daily every week

## 2022-12-02 NOTE — DATA REVIEWED
[de-identified] :  EXAM: NM BRAIN SPECT SA SD   PROCEDURE DATE: 11/04/2020    INTERPRETATION: RADIOPHARMACEUTICAL: 4.4 mCi I-123 Ioflupane, i.v.  CLINICAL INFORMATION: 64 year-old male with movement disorder, referred to evaluate for essential tremor versus Parkinson's disease. The patient was not on any confounding medication at the time of the study.  TECHNIQUE: The patient was pretreated with Lugol's solution 1 hour prior to administration of the above dose of radiopharmaceutical. SPECT imaging of the brain was performed approximately 3 hours after radiopharmaceutical injection.  COMPARISON: No prior DaTSCAN was available for comparison.  FINDINGS: There is symmetric activity in the caudate nuclei and putamina bilaterally.  IMPRESSION: Normal DaTScan. No radionuclide evidence of dopaminergic transporter depletion. Findings do not support the diagnosis of Parkinson's disease or a Parkinson's syndrome.       DENISE GUERRERO MD; Attending Nuclear Medicine This document has been electronically signed. Nov 4 2020 4:46PM

## 2022-12-02 NOTE — REVIEW OF SYSTEMS
[Fever] : no fever [Chills] : no chills [Feeling Poorly] : not feeling poorly [Feeling Tired] : not feeling tired [Recent Weight Gain (___ Lbs)] : no recent weight gain [Recent Weight Loss (___ Lbs)] : no recent weight loss [Suicidal] : not suicidal [Sleep Disturbances] : no sleep disturbances [Confused or Disoriented] : no confusion [Memory Lapses or Loss] : no memory loss [Decr. Concentrating Ability] : no decrease in concentrating ability [Difficulty with Language] : no ~M difficulty with language [Changed Thought Patterns] : no change in thought patterns [Repeating Questions] : no repeated questioning about recent events [Facial Weakness] : no facial weakness [Arm Weakness] : no arm weakness [Leg Weakness] : no leg weakness [Numbness] : no numbness [Tingling] : no tingling [Abnormal Sensation] : no abnormal sensation [Hypersensitivity] : no hypersensitivity [Seizures] : no convulsions [Dizziness] : no dizziness [Fainting] : no fainting [Lightheadedness] : no lightheadedness [Vertigo] : no vertigo [Migraine Headache] : no migraine headache [Difficulty Walking] : no difficulty walking [Inability to Walk] : able to walk [Ataxia] : no ataxia [Frequent Falls] : not falling [Limping] : not limping [Eye Pain] : no eye pain [Red Eyes] : eyes not red [Eyesight Problems] : no eyesight problems [Discharge From Eyes] : no purulent discharge from the eyes [Dry Eyes] : no dryness of the eyes [Eyes Itch] : no itching of the eyes [Hand Weakness] :  hand weakness [Poor Coordination] : poor coordination [Difficulty Writing] : difficulty writing [Difficulties in Speech] : speech difficulties [Negative] : Heme/Lymph

## 2022-12-02 NOTE — PHYSICAL EXAM
[Dysdiadochokinesia Bilaterally] : not present [Coordination - Dysmetria Impaired Finger-to-Nose Bilateral] : not present [Coordination - Dysmetria Impaired Heel-to-Shin Bilateral] : not present [Plantar Reflex Right Only] : normal on the right [Plantar Reflex Left Only] : normal on the left [___] : absent on the right [___] : absent on the left [FreeTextEntry1] : Resting tremor and dynamic /static slow rhythmic tremor left> right hand with stooped gait and reduced swing of the left hand

## 2022-12-02 NOTE — HISTORY OF PRESENT ILLNESS
[FreeTextEntry1] : He discontinued the primidone and began gabapentin but he is only using 100 mg in the morning and 200 mg at bedtime and is not sure that it is helping.  He had questions regarding his autonomic neuropathy test.

## 2022-12-02 NOTE — DATA REVIEWED
[de-identified] :  EXAM: NM BRAIN SPECT SA SD   PROCEDURE DATE: 11/04/2020    INTERPRETATION: RADIOPHARMACEUTICAL: 4.4 mCi I-123 Ioflupane, i.v.  CLINICAL INFORMATION: 64 year-old male with movement disorder, referred to evaluate for essential tremor versus Parkinson's disease. The patient was not on any confounding medication at the time of the study.  TECHNIQUE: The patient was pretreated with Lugol's solution 1 hour prior to administration of the above dose of radiopharmaceutical. SPECT imaging of the brain was performed approximately 3 hours after radiopharmaceutical injection.  COMPARISON: No prior DaTSCAN was available for comparison.  FINDINGS: There is symmetric activity in the caudate nuclei and putamina bilaterally.  IMPRESSION: Normal DaTScan. No radionuclide evidence of dopaminergic transporter depletion. Findings do not support the diagnosis of Parkinson's disease or a Parkinson's syndrome.       DENISE GUERRERO MD; Attending Nuclear Medicine This document has been electronically signed. Nov 4 2020 4:46PM

## 2023-02-23 ENCOUNTER — EMERGENCY (EMERGENCY)
Facility: HOSPITAL | Age: 68
LOS: 1 days | Discharge: HOME | End: 2023-02-23
Attending: STUDENT IN AN ORGANIZED HEALTH CARE EDUCATION/TRAINING PROGRAM
Payer: MEDICARE

## 2023-02-23 VITALS
OXYGEN SATURATION: 99 % | TEMPERATURE: 98 F | RESPIRATION RATE: 18 BRPM | SYSTOLIC BLOOD PRESSURE: 118 MMHG | HEART RATE: 60 BPM | DIASTOLIC BLOOD PRESSURE: 63 MMHG

## 2023-02-23 VITALS
TEMPERATURE: 98 F | RESPIRATION RATE: 18 BRPM | OXYGEN SATURATION: 95 % | SYSTOLIC BLOOD PRESSURE: 117 MMHG | HEART RATE: 73 BPM | DIASTOLIC BLOOD PRESSURE: 72 MMHG | WEIGHT: 199.96 LBS

## 2023-02-23 LAB
ALBUMIN SERPL ELPH-MCNC: 3.7 G/DL — SIGNIFICANT CHANGE UP (ref 3.5–5)
ALP SERPL-CCNC: 50 U/L — SIGNIFICANT CHANGE UP (ref 40–120)
ALT FLD-CCNC: 17 U/L DA — SIGNIFICANT CHANGE UP (ref 10–60)
ANION GAP SERPL CALC-SCNC: 7 MMOL/L — SIGNIFICANT CHANGE UP (ref 5–17)
AST SERPL-CCNC: 13 U/L — SIGNIFICANT CHANGE UP (ref 10–40)
BASOPHILS # BLD AUTO: 0.06 K/UL — SIGNIFICANT CHANGE UP (ref 0–0.2)
BASOPHILS NFR BLD AUTO: 0.8 % — SIGNIFICANT CHANGE UP (ref 0–2)
BILIRUB SERPL-MCNC: 0.4 MG/DL — SIGNIFICANT CHANGE UP (ref 0.2–1.2)
BUN SERPL-MCNC: 27 MG/DL — HIGH (ref 7–18)
CALCIUM SERPL-MCNC: 9 MG/DL — SIGNIFICANT CHANGE UP (ref 8.4–10.5)
CHLORIDE SERPL-SCNC: 108 MMOL/L — SIGNIFICANT CHANGE UP (ref 96–108)
CO2 SERPL-SCNC: 22 MMOL/L — SIGNIFICANT CHANGE UP (ref 22–31)
CREAT SERPL-MCNC: 1.89 MG/DL — HIGH (ref 0.5–1.3)
EGFR: 38 ML/MIN/1.73M2 — LOW
EOSINOPHIL # BLD AUTO: 0.27 K/UL — SIGNIFICANT CHANGE UP (ref 0–0.5)
EOSINOPHIL NFR BLD AUTO: 3.6 % — SIGNIFICANT CHANGE UP (ref 0–6)
GLUCOSE SERPL-MCNC: 166 MG/DL — HIGH (ref 70–99)
HCT VFR BLD CALC: 40.3 % — SIGNIFICANT CHANGE UP (ref 39–50)
HGB BLD-MCNC: 12.9 G/DL — LOW (ref 13–17)
IMM GRANULOCYTES NFR BLD AUTO: 1.1 % — HIGH (ref 0–0.9)
LYMPHOCYTES # BLD AUTO: 1.59 K/UL — SIGNIFICANT CHANGE UP (ref 1–3.3)
LYMPHOCYTES # BLD AUTO: 21.1 % — SIGNIFICANT CHANGE UP (ref 13–44)
MAGNESIUM SERPL-MCNC: 2.3 MG/DL — SIGNIFICANT CHANGE UP (ref 1.6–2.6)
MCHC RBC-ENTMCNC: 27.1 PG — SIGNIFICANT CHANGE UP (ref 27–34)
MCHC RBC-ENTMCNC: 32 GM/DL — SIGNIFICANT CHANGE UP (ref 32–36)
MCV RBC AUTO: 84.7 FL — SIGNIFICANT CHANGE UP (ref 80–100)
MONOCYTES # BLD AUTO: 0.65 K/UL — SIGNIFICANT CHANGE UP (ref 0–0.9)
MONOCYTES NFR BLD AUTO: 8.6 % — SIGNIFICANT CHANGE UP (ref 2–14)
NEUTROPHILS # BLD AUTO: 4.89 K/UL — SIGNIFICANT CHANGE UP (ref 1.8–7.4)
NEUTROPHILS NFR BLD AUTO: 64.8 % — SIGNIFICANT CHANGE UP (ref 43–77)
NRBC # BLD: 0 /100 WBCS — SIGNIFICANT CHANGE UP (ref 0–0)
PHOSPHATE SERPL-MCNC: 3.3 MG/DL — SIGNIFICANT CHANGE UP (ref 2.5–4.5)
PLATELET # BLD AUTO: 161 K/UL — SIGNIFICANT CHANGE UP (ref 150–400)
POTASSIUM SERPL-MCNC: 4 MMOL/L — SIGNIFICANT CHANGE UP (ref 3.5–5.3)
POTASSIUM SERPL-SCNC: 4 MMOL/L — SIGNIFICANT CHANGE UP (ref 3.5–5.3)
PROT SERPL-MCNC: 6.8 G/DL — SIGNIFICANT CHANGE UP (ref 6–8.3)
RBC # BLD: 4.76 M/UL — SIGNIFICANT CHANGE UP (ref 4.2–5.8)
RBC # FLD: 14.6 % — HIGH (ref 10.3–14.5)
SODIUM SERPL-SCNC: 137 MMOL/L — SIGNIFICANT CHANGE UP (ref 135–145)
WBC # BLD: 7.54 K/UL — SIGNIFICANT CHANGE UP (ref 3.8–10.5)
WBC # FLD AUTO: 7.54 K/UL — SIGNIFICANT CHANGE UP (ref 3.8–10.5)

## 2023-02-23 PROCEDURE — 93010 ELECTROCARDIOGRAM REPORT: CPT

## 2023-02-23 PROCEDURE — 99284 EMERGENCY DEPT VISIT MOD MDM: CPT

## 2023-02-23 PROCEDURE — 84100 ASSAY OF PHOSPHORUS: CPT

## 2023-02-23 PROCEDURE — 99283 EMERGENCY DEPT VISIT LOW MDM: CPT

## 2023-02-23 PROCEDURE — 36415 COLL VENOUS BLD VENIPUNCTURE: CPT

## 2023-02-23 PROCEDURE — 93005 ELECTROCARDIOGRAM TRACING: CPT

## 2023-02-23 PROCEDURE — 82962 GLUCOSE BLOOD TEST: CPT

## 2023-02-23 PROCEDURE — 80053 COMPREHEN METABOLIC PANEL: CPT

## 2023-02-23 PROCEDURE — 83735 ASSAY OF MAGNESIUM: CPT

## 2023-02-23 PROCEDURE — 85025 COMPLETE CBC W/AUTO DIFF WBC: CPT

## 2023-02-23 RX ORDER — SODIUM CHLORIDE 9 MG/ML
1000 INJECTION INTRAMUSCULAR; INTRAVENOUS; SUBCUTANEOUS ONCE
Refills: 0 | Status: COMPLETED | OUTPATIENT
Start: 2023-02-23 | End: 2023-02-23

## 2023-02-23 RX ORDER — MECLIZINE HCL 12.5 MG
25 TABLET ORAL ONCE
Refills: 0 | Status: COMPLETED | OUTPATIENT
Start: 2023-02-23 | End: 2023-02-23

## 2023-02-23 RX ORDER — MECLIZINE HCL 12.5 MG
25 TABLET ORAL ONCE
Refills: 0 | Status: DISCONTINUED | OUTPATIENT
Start: 2023-02-23 | End: 2023-02-23

## 2023-02-23 RX ADMIN — SODIUM CHLORIDE 1000 MILLILITER(S): 9 INJECTION INTRAMUSCULAR; INTRAVENOUS; SUBCUTANEOUS at 14:17

## 2023-02-23 RX ADMIN — Medication 25 MILLIGRAM(S): at 14:18

## 2023-02-23 NOTE — ED PROVIDER NOTE - OBJECTIVE STATEMENT
67-year-old male, PMH of HTN, presenting with 5 days of dizziness.  Dizziness is worse with moving his head and with walking.  Has had similar symptoms before but dizziness not improved with meclizine.  Denies any headache, changes in vision, or difficulty speaking.

## 2023-02-23 NOTE — ED PROVIDER NOTE - PHYSICAL EXAMINATION
General: well appearing male, no acute distress   HEENT: normocephalic, atraumatic, EOMI  Respiratory: normal work of breathing, lungs clear to auscultation bilaterally   Cardiac: regular rate and rhythm   Abdomen: soft, non-tender, no guarding or rebound   MSK: no swelling or tenderness of lower extremities, moving all extremities spontaneously   Skin: warm, dry   Neuro: A&Ox3, cranial nerves II-XII intact, 5/5 strength in all extremities, normal finger to nose   Psych: appropriate affect

## 2023-02-23 NOTE — ED PROVIDER NOTE - CLINICAL SUMMARY MEDICAL DECISION MAKING FREE TEXT BOX
67-year-old male presenting dizziness.  Has history of vertigo.  Nonfocal neuro exam so low concern for central vertigo.  Likely benign vertigo.  Will get labs to assess for electrolyte abnormalities.  No need for CT head at this time because low concern for central vertigo or intracranial hemorrhage or mass.    Patient updated on results and symptoms improved after treatment.  Ambulatory in ED without assistance.  Stable for outpatient follow-up.

## 2023-02-23 NOTE — ED PROVIDER NOTE - NSICDXPASTMEDICALHX_GEN_ALL_CORE_FT
PAST MEDICAL HISTORY:  Back pain, chronic     DM (diabetes mellitus)     HLD (hyperlipidemia)     HTN (hypertension)

## 2023-02-23 NOTE — ED ADULT NURSE NOTE - OBJECTIVE STATEMENT
Patient presents with c/o dizziness x today, neck pain x days no headache, nausea, vision changes noted.

## 2023-02-23 NOTE — ED PROVIDER NOTE - NSFOLLOWUPINSTRUCTIONS_ED_ALL_ED_FT
You were seen in the emergency department for dizziness.    Please follow-up with your primary care doctor in the next 24-48 hours.    Please continue to drink plenty fluids.    If you have any worsening symptoms, severe headache, chest pain, trouble breathing, or you have difficulty walking or speaking, please return to the emergency department.

## 2023-02-23 NOTE — ED PROVIDER NOTE - PATIENT PORTAL LINK FT
You can access the FollowMyHealth Patient Portal offered by [facility] by registering at the following website: [email or web address]. By joining [employer]’s FollowMyHealth portal, you will also be able to view your health information using other applications (apps) compatible with our system.

## 2023-03-17 ENCOUNTER — APPOINTMENT (OUTPATIENT)
Dept: NEUROLOGY | Facility: CLINIC | Age: 68
End: 2023-03-17

## 2023-06-19 ENCOUNTER — APPOINTMENT (OUTPATIENT)
Dept: NEUROLOGY | Facility: CLINIC | Age: 68
End: 2023-06-19
Payer: MEDICARE

## 2023-06-19 VITALS
OXYGEN SATURATION: 98 % | WEIGHT: 208 LBS | TEMPERATURE: 97.1 F | HEIGHT: 70 IN | BODY MASS INDEX: 29.78 KG/M2 | DIASTOLIC BLOOD PRESSURE: 70 MMHG | HEART RATE: 66 BPM | SYSTOLIC BLOOD PRESSURE: 120 MMHG

## 2023-06-19 DIAGNOSIS — M54.42 LUMBAGO WITH SCIATICA, LEFT SIDE: ICD-10-CM

## 2023-06-19 DIAGNOSIS — G89.29 LUMBAGO WITH SCIATICA, LEFT SIDE: ICD-10-CM

## 2023-06-19 DIAGNOSIS — M54.41 LUMBAGO WITH SCIATICA, LEFT SIDE: ICD-10-CM

## 2023-06-19 PROCEDURE — 99215 OFFICE O/P EST HI 40 MIN: CPT

## 2023-06-19 NOTE — HISTORY OF PRESENT ILLNESS
[FreeTextEntry1] : His primary care provider stopped the primidone and began carbidopa levodopa and gabapentin. It has not produced any benefit; he has worsening of tremors in situations where there might be a social interaction with other people, when he has to hurry  to accomplish multiple tasks in a limited time.

## 2023-06-19 NOTE — REVIEW OF SYSTEMS
[Hand Weakness] :  hand weakness [Poor Coordination] : poor coordination [Difficulty Writing] : difficulty writing [Difficulties in Speech] : speech difficulties [Migraine Headache] : migraine headaches [Joint Pain] : joint pain [Limb Pain] : limb pain [Negative] : Heme/Lymph [Fever] : no fever [Chills] : no chills [Feeling Poorly] : not feeling poorly [Feeling Tired] : not feeling tired [Recent Weight Gain (___ Lbs)] : no recent weight gain [Recent Weight Loss (___ Lbs)] : no recent weight loss [Suicidal] : not suicidal [Sleep Disturbances] : no sleep disturbances [Confused or Disoriented] : no confusion [Memory Lapses or Loss] : no memory loss [Decr. Concentrating Ability] : no decrease in concentrating ability [Difficulty with Language] : no ~M difficulty with language [Changed Thought Patterns] : no change in thought patterns [Repeating Questions] : no repeated questioning about recent events [Facial Weakness] : no facial weakness [Arm Weakness] : no arm weakness [Leg Weakness] : no leg weakness [Numbness] : no numbness [Tingling] : no tingling [Abnormal Sensation] : no abnormal sensation [Hypersensitivity] : no hypersensitivity [Seizures] : no convulsions [Dizziness] : no dizziness [Fainting] : no fainting [Lightheadedness] : no lightheadedness [Vertigo] : no vertigo [Difficulty Walking] : no difficulty walking [Inability to Walk] : able to walk [Ataxia] : no ataxia [Frequent Falls] : not falling [Limping] : not limping [Eye Pain] : no eye pain [Red Eyes] : eyes not red [Eyesight Problems] : no eyesight problems [Discharge From Eyes] : no purulent discharge from the eyes [Dry Eyes] : no dryness of the eyes [Eyes Itch] : no itching of the eyes [FreeTextEntry9] : Back and neck pain

## 2023-06-19 NOTE — PHYSICAL EXAM
[General Appearance - Alert] : alert [General Appearance - In No Acute Distress] : in no acute distress [Person] : oriented to person [Place] : oriented to place [Time] : oriented to time [Short Term Intact] : short term memory intact [Remote Intact] : remote memory intact [Span Intact] : the attention span was normal [Concentration Intact] : normal concentrating ability [Visual Intact] : visual attention was ~T not ~L decreased [Naming Objects] : no difficulty naming common objects [Repeating Phrases] : no difficulty repeating a phrase [Writing A Sentence] : no difficulty writing a sentence [Fluency] : fluency intact [Comprehension] : comprehension intact [Reading] : reading intact [Cranial Nerves Optic (II)] : visual acuity intact bilaterally,  visual fields full to confrontation, pupils equal round and reactive to light [Cranial Nerves Oculomotor (III)] : extraocular motion intact [Cranial Nerves Trigeminal (V)] : facial sensation intact symmetrically [Cranial Nerves Facial (VII)] : face symmetrical [Cranial Nerves Vestibulocochlear (VIII)] : hearing was intact bilaterally [Cranial Nerves Glossopharyngeal (IX)] : tongue and palate midline [Cranial Nerves Accessory (XI - Cranial And Spinal)] : head turning and shoulder shrug symmetric [Motor Strength] : muscle strength was normal in all four extremities [Sensation Tactile Decrease] : light touch was intact [Vibration Decrease Leg / Foot Both Ankles] : decreased at both ankles [Vibration Decrease Leg / Foot Toes Both Feet] : decreased at the toes of both feet  [Limited Balance] : the patient's balance was impaired [2+] : Brachioradialis left 2+ [1+] : Ankle jerk left 1+ [Primitive Reflexes] : primitive reflexes were present [Sclera] : the sclera and conjunctiva were normal [PERRL With Normal Accommodation] : pupils were equal in size, round, reactive to light, with normal accommodation [Extraocular Movements] : extraocular movements were intact [Motor Strength Upper Extremities Bilaterally] : strength was normal in both upper extremities [Motor Strength Lower Extremities Bilaterally] : strength was normal in both lower extremities [Past-pointing] : there was no past-pointing [Dysdiadochokinesia Bilaterally] : not present [Coordination - Dysmetria Impaired Finger-to-Nose Bilateral] : not present [Coordination - Dysmetria Impaired Heel-to-Shin Bilateral] : not present [Plantar Reflex Right Only] : normal on the right [Plantar Reflex Left Only] : normal on the left [___] : absent on the right [___] : absent on the left [FreeTextEntry1] : Resting tremor and dynamic /static slow rhythmic tremor left> right hand with stooped gait and reduced swing of the left hand [Neck Appearance] : the appearance of the neck was normal [Neck Cervical Mass (___cm)] : no neck mass was observed [Jugular Venous Distention Increased] : there was no jugular-venous distention [Thyroid Diffuse Enlargement] : the thyroid was not enlarged [Thyroid Nodule] : there were no palpable thyroid nodules [Auscultation Breath Sounds / Voice Sounds] : lungs were clear to auscultation bilaterally [Heart Rate And Rhythm] : heart rate was normal and rhythm regular [Heart Sounds Gallop] : no gallops [Heart Sounds] : normal S1 and S2 [Murmurs] : no murmurs [Heart Sounds Pericardial Friction Rub] : no pericardial rub [Full Pulse] : the pedal pulses are present [Edema] : there was no peripheral edema [Bowel Sounds] : normal bowel sounds [Abdomen Soft] : soft [Abdomen Tenderness] : non-tender [Abdomen Mass (___ Cm)] : no abdominal mass palpated [No CVA Tenderness] : no ~M costovertebral angle tenderness [No Spinal Tenderness] : no spinal tenderness [Abnormal Walk] : normal gait [Nail Clubbing] : no clubbing  or cyanosis of the fingernails [Musculoskeletal - Swelling] : no joint swelling seen [Motor Tone] : muscle strength and tone were normal [Skin Color & Pigmentation] : normal skin color and pigmentation [Skin Turgor] : normal skin turgor [] : no rash

## 2023-06-19 NOTE — DATA REVIEWED
[de-identified] :  EXAM: NM BRAIN SPECT SA SD   PROCEDURE DATE: 11/04/2020    INTERPRETATION: RADIOPHARMACEUTICAL: 4.4 mCi I-123 Ioflupane, i.v.  CLINICAL INFORMATION: 64 year-old male with movement disorder, referred to evaluate for essential tremor versus Parkinson's disease. The patient was not on any confounding medication at the time of the study.  TECHNIQUE: The patient was pretreated with Lugol's solution 1 hour prior to administration of the above dose of radiopharmaceutical. SPECT imaging of the brain was performed approximately 3 hours after radiopharmaceutical injection.  COMPARISON: No prior DaTSCAN was available for comparison.  FINDINGS: There is symmetric activity in the caudate nuclei and putamina bilaterally.  IMPRESSION: Normal DaTScan. No radionuclide evidence of dopaminergic transporter depletion. Findings do not support the diagnosis of Parkinson's disease or a Parkinson's syndrome.       DENISE GUERRERO MD; Attending Nuclear Medicine This document has been electronically signed. Nov 4 2020 4:46PM

## 2023-06-19 NOTE — ASSESSMENT
[FreeTextEntry1] : He has essential tremor.  Normal DaTscan is inconsistent with Parkinson's disease.  He was asked to taper off carbidopa levodopa and gabapentin.  Instead he will get her increased dose of primidone and is slowly increasing schedule along with topiramate also in a slowly increasing schedule to help tremor and neck pain and back pain.\par \par An Excel spreadsheet with the schedule for the multiple medications tapering stop schedule and change over to new medications medications was provided

## 2023-07-17 ENCOUNTER — INPATIENT (INPATIENT)
Facility: HOSPITAL | Age: 68
LOS: 3 days | Discharge: HOME | DRG: 446 | End: 2023-07-21
Attending: INTERNAL MEDICINE | Admitting: INTERNAL MEDICINE
Payer: MEDICARE

## 2023-07-17 VITALS
HEIGHT: 69 IN | SYSTOLIC BLOOD PRESSURE: 128 MMHG | OXYGEN SATURATION: 96 % | HEART RATE: 78 BPM | DIASTOLIC BLOOD PRESSURE: 78 MMHG | RESPIRATION RATE: 18 BRPM | TEMPERATURE: 99 F | WEIGHT: 207.9 LBS

## 2023-07-17 DIAGNOSIS — R10.9 UNSPECIFIED ABDOMINAL PAIN: ICD-10-CM

## 2023-07-17 LAB
ALBUMIN SERPL ELPH-MCNC: 3.8 G/DL — SIGNIFICANT CHANGE UP (ref 3.5–5)
ALP SERPL-CCNC: 57 U/L — SIGNIFICANT CHANGE UP (ref 40–120)
ALT FLD-CCNC: 23 U/L DA — SIGNIFICANT CHANGE UP (ref 10–60)
ANION GAP SERPL CALC-SCNC: 6 MMOL/L — SIGNIFICANT CHANGE UP (ref 5–17)
APPEARANCE UR: CLEAR — SIGNIFICANT CHANGE UP
AST SERPL-CCNC: 18 U/L — SIGNIFICANT CHANGE UP (ref 10–40)
BASOPHILS # BLD AUTO: 0.05 K/UL — SIGNIFICANT CHANGE UP (ref 0–0.2)
BASOPHILS NFR BLD AUTO: 0.5 % — SIGNIFICANT CHANGE UP (ref 0–2)
BILIRUB SERPL-MCNC: 0.6 MG/DL — SIGNIFICANT CHANGE UP (ref 0.2–1.2)
BILIRUB UR-MCNC: NEGATIVE — SIGNIFICANT CHANGE UP
BLD GP AB SCN SERPL QL: SIGNIFICANT CHANGE UP
BUN SERPL-MCNC: 26 MG/DL — HIGH (ref 7–18)
CALCIUM SERPL-MCNC: 9.6 MG/DL — SIGNIFICANT CHANGE UP (ref 8.4–10.5)
CHLORIDE SERPL-SCNC: 110 MMOL/L — HIGH (ref 96–108)
CO2 SERPL-SCNC: 22 MMOL/L — SIGNIFICANT CHANGE UP (ref 22–31)
COLOR SPEC: YELLOW — SIGNIFICANT CHANGE UP
CREAT SERPL-MCNC: 1.87 MG/DL — HIGH (ref 0.5–1.3)
DIFF PNL FLD: NEGATIVE — SIGNIFICANT CHANGE UP
EGFR: 38 ML/MIN/1.73M2 — LOW
EOSINOPHIL # BLD AUTO: 0.24 K/UL — SIGNIFICANT CHANGE UP (ref 0–0.5)
EOSINOPHIL NFR BLD AUTO: 2.6 % — SIGNIFICANT CHANGE UP (ref 0–6)
GLUCOSE SERPL-MCNC: 229 MG/DL — HIGH (ref 70–99)
GLUCOSE UR QL: 1000 MG/DL
HCT VFR BLD CALC: 45.1 % — SIGNIFICANT CHANGE UP (ref 39–50)
HGB BLD-MCNC: 14.2 G/DL — SIGNIFICANT CHANGE UP (ref 13–17)
IMM GRANULOCYTES NFR BLD AUTO: 0.8 % — SIGNIFICANT CHANGE UP (ref 0–0.9)
KETONES UR-MCNC: NEGATIVE — SIGNIFICANT CHANGE UP
LACTATE SERPL-SCNC: 1.5 MMOL/L — SIGNIFICANT CHANGE UP (ref 0.7–2)
LEUKOCYTE ESTERASE UR-ACNC: NEGATIVE — SIGNIFICANT CHANGE UP
LIDOCAIN IGE QN: 182 U/L — SIGNIFICANT CHANGE UP (ref 73–393)
LYMPHOCYTES # BLD AUTO: 1.58 K/UL — SIGNIFICANT CHANGE UP (ref 1–3.3)
LYMPHOCYTES # BLD AUTO: 17.3 % — SIGNIFICANT CHANGE UP (ref 13–44)
MCHC RBC-ENTMCNC: 26.1 PG — LOW (ref 27–34)
MCHC RBC-ENTMCNC: 31.5 GM/DL — LOW (ref 32–36)
MCV RBC AUTO: 82.8 FL — SIGNIFICANT CHANGE UP (ref 80–100)
MONOCYTES # BLD AUTO: 0.75 K/UL — SIGNIFICANT CHANGE UP (ref 0–0.9)
MONOCYTES NFR BLD AUTO: 8.2 % — SIGNIFICANT CHANGE UP (ref 2–14)
NEUTROPHILS # BLD AUTO: 6.45 K/UL — SIGNIFICANT CHANGE UP (ref 1.8–7.4)
NEUTROPHILS NFR BLD AUTO: 70.6 % — SIGNIFICANT CHANGE UP (ref 43–77)
NITRITE UR-MCNC: NEGATIVE — SIGNIFICANT CHANGE UP
NRBC # BLD: 0 /100 WBCS — SIGNIFICANT CHANGE UP (ref 0–0)
PH UR: 5 — SIGNIFICANT CHANGE UP (ref 5–8)
PLATELET # BLD AUTO: 212 K/UL — SIGNIFICANT CHANGE UP (ref 150–400)
POTASSIUM SERPL-MCNC: 4.4 MMOL/L — SIGNIFICANT CHANGE UP (ref 3.5–5.3)
POTASSIUM SERPL-SCNC: 4.4 MMOL/L — SIGNIFICANT CHANGE UP (ref 3.5–5.3)
PROT SERPL-MCNC: 7.8 G/DL — SIGNIFICANT CHANGE UP (ref 6–8.3)
PROT UR-MCNC: NEGATIVE — SIGNIFICANT CHANGE UP
RBC # BLD: 5.45 M/UL — SIGNIFICANT CHANGE UP (ref 4.2–5.8)
RBC # FLD: 14.6 % — HIGH (ref 10.3–14.5)
SODIUM SERPL-SCNC: 138 MMOL/L — SIGNIFICANT CHANGE UP (ref 135–145)
SP GR SPEC: 1.02 — SIGNIFICANT CHANGE UP (ref 1.01–1.02)
UROBILINOGEN FLD QL: NEGATIVE — SIGNIFICANT CHANGE UP
WBC # BLD: 9.14 K/UL — SIGNIFICANT CHANGE UP (ref 3.8–10.5)
WBC # FLD AUTO: 9.14 K/UL — SIGNIFICANT CHANGE UP (ref 3.8–10.5)

## 2023-07-17 PROCEDURE — 76705 ECHO EXAM OF ABDOMEN: CPT | Mod: 26

## 2023-07-17 PROCEDURE — 93010 ELECTROCARDIOGRAM REPORT: CPT

## 2023-07-17 PROCEDURE — 99222 1ST HOSP IP/OBS MODERATE 55: CPT

## 2023-07-17 PROCEDURE — 74177 CT ABD & PELVIS W/CONTRAST: CPT | Mod: 26,MA

## 2023-07-17 PROCEDURE — 99285 EMERGENCY DEPT VISIT HI MDM: CPT

## 2023-07-17 RX ORDER — ACETAMINOPHEN 500 MG
1000 TABLET ORAL ONCE
Refills: 0 | Status: COMPLETED | OUTPATIENT
Start: 2023-07-17 | End: 2023-07-17

## 2023-07-17 RX ORDER — ONDANSETRON 8 MG/1
4 TABLET, FILM COATED ORAL EVERY 8 HOURS
Refills: 0 | Status: DISCONTINUED | OUTPATIENT
Start: 2023-07-17 | End: 2023-07-21

## 2023-07-17 RX ORDER — INSULIN LISPRO 100/ML
VIAL (ML) SUBCUTANEOUS AT BEDTIME
Refills: 0 | Status: DISCONTINUED | OUTPATIENT
Start: 2023-07-17 | End: 2023-07-21

## 2023-07-17 RX ORDER — SODIUM CHLORIDE 9 MG/ML
1000 INJECTION INTRAMUSCULAR; INTRAVENOUS; SUBCUTANEOUS ONCE
Refills: 0 | Status: COMPLETED | OUTPATIENT
Start: 2023-07-17 | End: 2023-07-17

## 2023-07-17 RX ORDER — CIPROFLOXACIN LACTATE 400MG/40ML
400 VIAL (ML) INTRAVENOUS ONCE
Refills: 0 | Status: COMPLETED | OUTPATIENT
Start: 2023-07-17 | End: 2023-07-17

## 2023-07-17 RX ORDER — ACETAMINOPHEN 500 MG
650 TABLET ORAL EVERY 6 HOURS
Refills: 0 | Status: DISCONTINUED | OUTPATIENT
Start: 2023-07-17 | End: 2023-07-21

## 2023-07-17 RX ORDER — MORPHINE SULFATE 50 MG/1
4 CAPSULE, EXTENDED RELEASE ORAL ONCE
Refills: 0 | Status: DISCONTINUED | OUTPATIENT
Start: 2023-07-17 | End: 2023-07-17

## 2023-07-17 RX ORDER — METRONIDAZOLE 500 MG
500 TABLET ORAL ONCE
Refills: 0 | Status: COMPLETED | OUTPATIENT
Start: 2023-07-17 | End: 2023-07-17

## 2023-07-17 RX ORDER — INSULIN LISPRO 100/ML
VIAL (ML) SUBCUTANEOUS
Refills: 0 | Status: DISCONTINUED | OUTPATIENT
Start: 2023-07-17 | End: 2023-07-21

## 2023-07-17 RX ORDER — FAMOTIDINE 10 MG/ML
20 INJECTION INTRAVENOUS ONCE
Refills: 0 | Status: COMPLETED | OUTPATIENT
Start: 2023-07-17 | End: 2023-07-17

## 2023-07-17 RX ADMIN — Medication 30 MILLILITER(S): at 15:24

## 2023-07-17 RX ADMIN — MORPHINE SULFATE 4 MILLIGRAM(S): 50 CAPSULE, EXTENDED RELEASE ORAL at 19:29

## 2023-07-17 RX ADMIN — FAMOTIDINE 20 MILLIGRAM(S): 10 INJECTION INTRAVENOUS at 15:24

## 2023-07-17 RX ADMIN — Medication 200 MILLIGRAM(S): at 23:07

## 2023-07-17 RX ADMIN — Medication 1000 MILLIGRAM(S): at 17:38

## 2023-07-17 RX ADMIN — SODIUM CHLORIDE 1000 MILLILITER(S): 9 INJECTION INTRAMUSCULAR; INTRAVENOUS; SUBCUTANEOUS at 22:28

## 2023-07-17 RX ADMIN — Medication 1000 MILLIGRAM(S): at 17:53

## 2023-07-17 RX ADMIN — Medication 100 MILLIGRAM(S): at 22:28

## 2023-07-17 RX ADMIN — MORPHINE SULFATE 4 MILLIGRAM(S): 50 CAPSULE, EXTENDED RELEASE ORAL at 19:59

## 2023-07-17 RX ADMIN — Medication 400 MILLIGRAM(S): at 17:23

## 2023-07-17 NOTE — H&P ADULT - PROBLEM SELECTOR PLAN 1
Pt presents with abdominla pain with nausea for the past 3 days, exacerbated by food.  CT A/P - Gallstones. Mild gallbladder wall thickening noted. Correlate for acute cholecystitis.  RUQ ultrasound - Gallstones without sonographic evidence of acute cholecystitis. Fatty infiltration of the liver. negative Doe's sign  - C/w Pain control - severe pain morphine q6 hrs  - C/w PPI    Surgery Following - no acute intervention

## 2023-07-17 NOTE — H&P ADULT - PROBLEM SELECTOR PLAN 4
Pt has a history of HTN, takes fosinopril 20mg, labetolol 100mg, and amlodipine at home  - C/w __ Pt has a history of HTN, takes fosinopril 20mg, Labetalol 100mg TID, and amlodipine at home  - C/w home meds with holding parameters

## 2023-07-17 NOTE — CONSULT NOTE ADULT - SUBJECTIVE AND OBJECTIVE BOX
Patient seen and examined at the bedside with Greek  (#[id number]).  Patient is a 67M presenting to the ED for complaint of abdominal pain and distention for 3 days.  Per patient, denies nausea and vomiting.  Endorses normal PO intake.  Endorses normal bowel function, last BM and flatus this morning reported as normal.  Imaging showing cholelithiasis without evidence of cholecystitis.  General Surgery consulted to evaluate.  On further discussion with patient, endorses burning epigastric pain which is worse after eating.  Denies prior history of EGD or colonoscopy.  Denies prior episodes of RUQ pain.      PMH: HTN, HLD, DM, Parkinsons vs tremor  PSH: cardiac cath (2017)  NKDA    Vital Signs Last 24 Hrs  T(C): 36.8 (17 Jul 2023 19:53), Max: 37.2 (17 Jul 2023 14:20)  T(F): 98.3 (17 Jul 2023 19:53), Max: 99 (17 Jul 2023 14:20)  HR: 59 (17 Jul 2023 19:53) (59 - 78)  BP: 113/67 (17 Jul 2023 19:53) (113/67 - 128/78)  BP(mean): 87 (17 Jul 2023 14:20) (87 - 87)  RR: 18 (17 Jul 2023 19:53) (18 - 18)  SpO2: 95% (17 Jul 2023 19:53) (95% - 96%)    Parameters below as of 17 Jul 2023 19:53  Patient On (Oxygen Delivery Method): room air    Physical:   GA: AAOx3, NAD  HEENT: normocephalic, atraumatic  CVS: RRR  Pulm: nonlabored breathing  Abd: soft, mildly distended.  endorsing diffuse tenderness to palpation, mostly epigastric.  some LLQ tenderness on exam.  nonperitoneal.  no rebound tenderness or guarding.   : normal external genitalia                          14.2   9.14  )-----------( 212      ( 17 Jul 2023 13:40 )             45.1     07-17    138  |  110<H>  |  26<H>  ----------------------------<  229<H>  4.4   |  22  |  1.87<H>    Ca    9.6      17 Jul 2023 13:40    TPro  7.8  /  Alb  3.8  /  TBili  0.6  /  DBili  x   /  AST  18  /  ALT  23  /  AlkPhos  57  07-17    Lactate, Blood: 1.5 mmol/L (07.17.23 @ 13:40)     < from: CT Abdomen and Pelvis w/ IV Cont (07.17.23 @ 15:20) >  IMPRESSION:  1. Gallstones. Mild gallbladder wall thickening noted. Correlate for   acute cholecystitis.  2. No evidence for mechanical bowel obstruction. No colonic wall   thickening. No evidence for acute appendicitis.  3. Nonspecific 9 mm focus of nodular enhancement within the distal   pancreatic tail region. Nonemergent contrast enhanced abdominal MRI   recommended for further assessment. 1.4 x 1.1 cm peripancreatic lymph   node (series 2, image 52)    < end of copied text >  < from: US Abdomen Upper Quadrant Right (07.17.23 @ 18:10) >  TECHNIQUE: Sonography of the right upper quadrant.    FINDINGS:  Liver: Increased echogenicity.  Bile ducts: Normal caliber. Common bile duct measures 5 mm.  Gallbladder: Gallstones without wall thickening or pericholecystic fluid.   Sonographic Doe's sign is negative.  Pancreas: Visualized portions are within normal limits.  Right kidney: 10.3 cm. No hydronephrosis.  Ascites: None.  IVC: Visualized portions are within normal limits.    IMPRESSION:  Gallstones without sonographic evidence of acute cholecystitis.    Fatty infiltration of the liver.    < end of copied text >   Patient seen and examined at the bedside with Bulgarian  (#[id number]).  Patient is a 67M presenting to the ED for complaint of abdominal pain and distention for 3 days.  Per patient, denies nausea and vomiting.  Endorses normal PO intake.  Endorses normal bowel function, last BM and flatus this morning reported as normal.  Imaging showing cholelithiasis without evidence of cholecystitis.  General Surgery consulted to evaluate.  On further discussion with patient, endorses burning epigastric pain which is worse after eating.  Denies prior history of EGD.  Denies prior episodes of RUQ pain.      PMH: HTN, HLD, DM, Parkinsons vs tremor  PSH: cardiac cath (2017)  NKDA    Vital Signs Last 24 Hrs  T(C): 36.8 (17 Jul 2023 19:53), Max: 37.2 (17 Jul 2023 14:20)  T(F): 98.3 (17 Jul 2023 19:53), Max: 99 (17 Jul 2023 14:20)  HR: 59 (17 Jul 2023 19:53) (59 - 78)  BP: 113/67 (17 Jul 2023 19:53) (113/67 - 128/78)  BP(mean): 87 (17 Jul 2023 14:20) (87 - 87)  RR: 18 (17 Jul 2023 19:53) (18 - 18)  SpO2: 95% (17 Jul 2023 19:53) (95% - 96%)    Parameters below as of 17 Jul 2023 19:53  Patient On (Oxygen Delivery Method): room air    Physical:   GA: AAOx3, NAD  HEENT: normocephalic, atraumatic  CVS: RRR  Pulm: nonlabored breathing  Abd: soft, mildly distended.  endorsing diffuse tenderness to palpation, mostly epigastric.  some LLQ tenderness on exam.  nonperitoneal.  no rebound tenderness or guarding.   : normal external genitalia                          14.2   9.14  )-----------( 212      ( 17 Jul 2023 13:40 )             45.1     07-17    138  |  110<H>  |  26<H>  ----------------------------<  229<H>  4.4   |  22  |  1.87<H>    Ca    9.6      17 Jul 2023 13:40    TPro  7.8  /  Alb  3.8  /  TBili  0.6  /  DBili  x   /  AST  18  /  ALT  23  /  AlkPhos  57  07-17    Lactate, Blood: 1.5 mmol/L (07.17.23 @ 13:40)     < from: CT Abdomen and Pelvis w/ IV Cont (07.17.23 @ 15:20) >  IMPRESSION:  1. Gallstones. Mild gallbladder wall thickening noted. Correlate for   acute cholecystitis.  2. No evidence for mechanical bowel obstruction. No colonic wall   thickening. No evidence for acute appendicitis.  3. Nonspecific 9 mm focus of nodular enhancement within the distal   pancreatic tail region. Nonemergent contrast enhanced abdominal MRI   recommended for further assessment. 1.4 x 1.1 cm peripancreatic lymph   node (series 2, image 52)    < end of copied text >  < from: US Abdomen Upper Quadrant Right (07.17.23 @ 18:10) >  TECHNIQUE: Sonography of the right upper quadrant.    FINDINGS:  Liver: Increased echogenicity.  Bile ducts: Normal caliber. Common bile duct measures 5 mm.  Gallbladder: Gallstones without wall thickening or pericholecystic fluid.   Sonographic Doe's sign is negative.  Pancreas: Visualized portions are within normal limits.  Right kidney: 10.3 cm. No hydronephrosis.  Ascites: None.  IVC: Visualized portions are within normal limits.    IMPRESSION:  Gallstones without sonographic evidence of acute cholecystitis.    Fatty infiltration of the liver.    < end of copied text >

## 2023-07-17 NOTE — ED PROVIDER NOTE - CLINICAL SUMMARY MEDICAL DECISION MAKING FREE TEXT BOX
67-year-old male hx of HTN, HLD, DM, presenting with diffuse abdominal pain and distension for 3 days. Will check labs, urine, CTAP, provide GI meds for now, and reassess.

## 2023-07-17 NOTE — H&P ADULT - NSICDXPASTMEDICALHX_GEN_ALL_CORE_FT
PAST MEDICAL HISTORY:  Back pain, chronic     CKD (chronic kidney disease)     DM (diabetes mellitus)     HLD (hyperlipidemia)     HTN (hypertension)

## 2023-07-17 NOTE — ED ADULT NURSE NOTE - CHPI ED NUR SYMPTOMS NEG
no abdominal distension/no blood in stool/no burning urination/no chills/no diarrhea/no dysuria/no fever/no hematuria
no discharge, no irritation, no pain, no redness, and no visual changes.

## 2023-07-17 NOTE — H&P ADULT - ATTENDING COMMENTS
Pt seen on behalf of Dr Bell   Non specific abdominal pain   Clinical findings and imaging  unremarkable for surgical abdomen     Vital Signs Last 24 Hrs  T(C): 36.7 (18 Jul 2023 05:02), Max: 37.2 (17 Jul 2023 14:20)  T(F): 98 (18 Jul 2023 05:02), Max: 99 (17 Jul 2023 14:20)  HR: 66 (18 Jul 2023 06:38) (54 - 78)  BP: 137/77 (18 Jul 2023 06:38) (113/67 - 152/85)  BP(mean): 87 (18 Jul 2023 04:18) (87 - 87)  RR: 16 (18 Jul 2023 05:02) (16 - 18)  SpO2: 98% (18 Jul 2023 05:02) (95% - 98%)    Parameters below as of 18 Jul 2023 05:02  Patient On (Oxygen Delivery Method): room air    Plan   Conservative management   Supportive care

## 2023-07-17 NOTE — CONSULT NOTE ADULT - ASSESSMENT
A/P:   67M presenting to the ED with complaint of diffuse abdominal pain. Per patient, pain is predominantly epigastric and burning in nature.  Patient without tenderness in the RUQ on exam.  Imaging showing gallstone however no radiographic evidence of cholecystitis.  No other signs of any surgical pathology present on imaging.  Picture more consistent with gastritis/PUD given location and nature of pain.  As patient has not had any prior endoscopic work up would benefit from GI work up.    -No acute surgical intervention at this time  -patient would benefit from GI work up   -continue PPI  -pain control   -can follow up with General Surgery electively to discuss removal of gallbladder on discharge     Case discussed with on call General Surgery attending, Dr. Noriega

## 2023-07-17 NOTE — ED PROVIDER NOTE - OBJECTIVE STATEMENT
67-year-old male hx of HTN, HLD, DM, presenting with diffuse abdominal pain and distension for 3 days. Has had a few episodes of emesis/spitting up during sleep (?). No diarrhea, constipation, bloody stools, urinary symptoms, or any other complaints.

## 2023-07-17 NOTE — H&P ADULT - PROBLEM SELECTOR PLAN 3
Pt has a history of DM, takes glimepriide 1mg, januvia 100mg, Jardiance 10mg, repaglinide 2mg at home  - C/w ISS  FS ACHS

## 2023-07-17 NOTE — ED PROVIDER NOTE - PROGRESS NOTE DETAILS
Labs ok.  CTAP with gallstones.  RUQ US with gallstones but no e/o cholecystitis.  Seen by Surgery - low suspicion that gallstones are etiology of pain, possibly PUD, advise Medicine admission/posisble GI eval.

## 2023-07-17 NOTE — H&P ADULT - NSHPPHYSICALEXAM_GEN_ALL_CORE
GENERAL: NAD, regular build  HEAD:  Atraumatic, Normocephalic  EYES: EOMI, PERRLA, conjunctiva and sclera clear  NECK: Supple  CHEST/LUNG: Clear to auscultation bilaterally, no RRW  HEART: Regular rate and rhythm; No murmurs, rubs, or gallops  ABDOMEN: Soft, LLQ tenderness, Nondistended; Bowel sounds present  EXTREMITIES:  2+ Peripheral Pulses, No edema  PSYCH: AAOx3  NEUROLOGY: non-focal  SKIN: No rashes or lesions

## 2023-07-17 NOTE — H&P ADULT - HISTORY OF PRESENT ILLNESS
This is a 68 y/o M from home, with PMHx HTN, HLD, DM, and CKD who presents with abdominal pain. Pt states pain started 3 days ago and is located throughout the entire abdomen. Pt states pain sometimes radiates to the right shoulder area and he also feels a burning sensation in his epigastric area. Pt states pain is worse with eating and has been constant for the past 3 days. Pt endorses some nausea and fevers at home. Pt also states he has been drooling more at night time. Pt adds that he recently stopped taking carbidopa and levodopa after several years of taking it, due to his neurologist, Dr. Mcconnell telling him he does not have Parkinson's anymore and only has a tremor. Pt denies any recent travel, new foods, headaches, dizziness, CP, SOB, diarrhea, constipation, numbness or tingling.  This is a 66 y/o M from home, with PMHx HTN, HLD, DM, and CKD who presents with abdominal pain. Pt states pain started 3 days ago and is located throughout the entire abdomen. Pt states pain sometimes radiates to the right shoulder area and he also feels a burning sensation in his epigastric area. Pt states pain is worse with eating and has been constant for the past 3 days. Pt endorses some nausea and fevers at home. Pt also states he has been drooling more at night time. Pt adds that he recently stopped taking carbidopa and levodopa after several years of taking it, due to his neurologist, Dr. Mcconnell recommending he stop. Pt denies any new foods, headaches, dizziness, CP, SOB, diarrhea, constipation, numbness or tingling.  This is a 68 y/o M from home, with PMHx HTN, HLD, DM, and CKD who presents with abdominal pain. Pt states pain started 3 days ago and is located throughout the entire abdomen. Pt states pain sometimes radiates to the right shoulder area and he also feels a burning sensation in his epigastric area. Pt states pain is worse with eating and has been constant for the past 3 days. Pt endorses some nausea and fevers at home. Pt also states he has been drooling more at night time. Pt adds that he recently stopped taking carbidopa and levodopa after several years of taking it, due to his neurologist, Dr. Mcconnell recommending he stop. Last colonoscopy in 2008 was normal. No prior endoscopies. Pt denies any new foods, headaches, dizziness, CP, SOB, diarrhea, constipation, numbness or tingling.

## 2023-07-17 NOTE — ED ADULT NURSE NOTE - OBJECTIVE STATEMENT
Patient presented to ED c/o non-radiating abdominal pain x3 days with tenderness during palpation. Pt stated episodes of emesis but denies any diarrhea. Pt denies any blood in stool or discomfort during urination.

## 2023-07-17 NOTE — H&P ADULT - ASSESSMENT
66 y/o M from home, with PMHx HTN, HLD, DM, and CKD who presents with abdominal pain. Admitted for intractable abdominal pain.
no

## 2023-07-18 DIAGNOSIS — I10 ESSENTIAL (PRIMARY) HYPERTENSION: ICD-10-CM

## 2023-07-18 DIAGNOSIS — E11.9 TYPE 2 DIABETES MELLITUS WITHOUT COMPLICATIONS: Chronic | ICD-10-CM

## 2023-07-18 DIAGNOSIS — K80.20 CALCULUS OF GALLBLADDER WITHOUT CHOLECYSTITIS WITHOUT OBSTRUCTION: ICD-10-CM

## 2023-07-18 DIAGNOSIS — E78.5 HYPERLIPIDEMIA, UNSPECIFIED: ICD-10-CM

## 2023-07-18 DIAGNOSIS — E11.9 TYPE 2 DIABETES MELLITUS WITHOUT COMPLICATIONS: ICD-10-CM

## 2023-07-18 DIAGNOSIS — R10.9 UNSPECIFIED ABDOMINAL PAIN: ICD-10-CM

## 2023-07-18 DIAGNOSIS — R93.89 ABNORMAL FINDINGS ON DIAGNOSTIC IMAGING OF OTHER SPECIFIED BODY STRUCTURES: ICD-10-CM

## 2023-07-18 DIAGNOSIS — Z02.9 ENCOUNTER FOR ADMINISTRATIVE EXAMINATIONS, UNSPECIFIED: ICD-10-CM

## 2023-07-18 DIAGNOSIS — N18.9 CHRONIC KIDNEY DISEASE, UNSPECIFIED: ICD-10-CM

## 2023-07-18 DIAGNOSIS — Z29.9 ENCOUNTER FOR PROPHYLACTIC MEASURES, UNSPECIFIED: ICD-10-CM

## 2023-07-18 LAB
A1C WITH ESTIMATED AVERAGE GLUCOSE RESULT: 8.9 % — HIGH (ref 4–5.6)
ALBUMIN SERPL ELPH-MCNC: 3.7 G/DL — SIGNIFICANT CHANGE UP (ref 3.5–5)
ALP SERPL-CCNC: 52 U/L — SIGNIFICANT CHANGE UP (ref 40–120)
ALT FLD-CCNC: 23 U/L DA — SIGNIFICANT CHANGE UP (ref 10–60)
ANION GAP SERPL CALC-SCNC: 8 MMOL/L — SIGNIFICANT CHANGE UP (ref 5–17)
AST SERPL-CCNC: 11 U/L — SIGNIFICANT CHANGE UP (ref 10–40)
BASOPHILS # BLD AUTO: 0.05 K/UL — SIGNIFICANT CHANGE UP (ref 0–0.2)
BASOPHILS NFR BLD AUTO: 0.7 % — SIGNIFICANT CHANGE UP (ref 0–2)
BILIRUB SERPL-MCNC: 0.7 MG/DL — SIGNIFICANT CHANGE UP (ref 0.2–1.2)
BUN SERPL-MCNC: 21 MG/DL — HIGH (ref 7–18)
CALCIUM SERPL-MCNC: 9.1 MG/DL — SIGNIFICANT CHANGE UP (ref 8.4–10.5)
CHLORIDE SERPL-SCNC: 111 MMOL/L — HIGH (ref 96–108)
CO2 SERPL-SCNC: 21 MMOL/L — LOW (ref 22–31)
CREAT SERPL-MCNC: 1.68 MG/DL — HIGH (ref 0.5–1.3)
CULTURE RESULTS: SIGNIFICANT CHANGE UP
EGFR: 44 ML/MIN/1.73M2 — LOW
EOSINOPHIL # BLD AUTO: 0.28 K/UL — SIGNIFICANT CHANGE UP (ref 0–0.5)
EOSINOPHIL NFR BLD AUTO: 3.9 % — SIGNIFICANT CHANGE UP (ref 0–6)
ESTIMATED AVERAGE GLUCOSE: 209 MG/DL — HIGH (ref 68–114)
GLUCOSE BLDC GLUCOMTR-MCNC: 114 MG/DL — HIGH (ref 70–99)
GLUCOSE BLDC GLUCOMTR-MCNC: 197 MG/DL — HIGH (ref 70–99)
GLUCOSE BLDC GLUCOMTR-MCNC: 224 MG/DL — HIGH (ref 70–99)
GLUCOSE BLDC GLUCOMTR-MCNC: 232 MG/DL — HIGH (ref 70–99)
GLUCOSE SERPL-MCNC: 92 MG/DL — SIGNIFICANT CHANGE UP (ref 70–99)
HCT VFR BLD CALC: 42.5 % — SIGNIFICANT CHANGE UP (ref 39–50)
HCV AB S/CO SERPL IA: 0.08 S/CO — SIGNIFICANT CHANGE UP (ref 0–0.99)
HCV AB SERPL-IMP: SIGNIFICANT CHANGE UP
HGB BLD-MCNC: 13.6 G/DL — SIGNIFICANT CHANGE UP (ref 13–17)
IMM GRANULOCYTES NFR BLD AUTO: 1 % — HIGH (ref 0–0.9)
LYMPHOCYTES # BLD AUTO: 1.69 K/UL — SIGNIFICANT CHANGE UP (ref 1–3.3)
LYMPHOCYTES # BLD AUTO: 23.3 % — SIGNIFICANT CHANGE UP (ref 13–44)
MAGNESIUM SERPL-MCNC: 2.4 MG/DL — SIGNIFICANT CHANGE UP (ref 1.6–2.6)
MCHC RBC-ENTMCNC: 25.9 PG — LOW (ref 27–34)
MCHC RBC-ENTMCNC: 32 GM/DL — SIGNIFICANT CHANGE UP (ref 32–36)
MCV RBC AUTO: 80.8 FL — SIGNIFICANT CHANGE UP (ref 80–100)
MONOCYTES # BLD AUTO: 0.68 K/UL — SIGNIFICANT CHANGE UP (ref 0–0.9)
MONOCYTES NFR BLD AUTO: 9.4 % — SIGNIFICANT CHANGE UP (ref 2–14)
NEUTROPHILS # BLD AUTO: 4.48 K/UL — SIGNIFICANT CHANGE UP (ref 1.8–7.4)
NEUTROPHILS NFR BLD AUTO: 61.7 % — SIGNIFICANT CHANGE UP (ref 43–77)
NRBC # BLD: 0 /100 WBCS — SIGNIFICANT CHANGE UP (ref 0–0)
PHOSPHATE SERPL-MCNC: 3.3 MG/DL — SIGNIFICANT CHANGE UP (ref 2.5–4.5)
PLATELET # BLD AUTO: 177 K/UL — SIGNIFICANT CHANGE UP (ref 150–400)
POTASSIUM SERPL-MCNC: 3.8 MMOL/L — SIGNIFICANT CHANGE UP (ref 3.5–5.3)
POTASSIUM SERPL-SCNC: 3.8 MMOL/L — SIGNIFICANT CHANGE UP (ref 3.5–5.3)
PROT SERPL-MCNC: 7 G/DL — SIGNIFICANT CHANGE UP (ref 6–8.3)
RBC # BLD: 5.26 M/UL — SIGNIFICANT CHANGE UP (ref 4.2–5.8)
RBC # FLD: 14.6 % — HIGH (ref 10.3–14.5)
SODIUM SERPL-SCNC: 140 MMOL/L — SIGNIFICANT CHANGE UP (ref 135–145)
SPECIMEN SOURCE: SIGNIFICANT CHANGE UP
WBC # BLD: 7.25 K/UL — SIGNIFICANT CHANGE UP (ref 3.8–10.5)
WBC # FLD AUTO: 7.25 K/UL — SIGNIFICANT CHANGE UP (ref 3.8–10.5)

## 2023-07-18 PROCEDURE — 99222 1ST HOSP IP/OBS MODERATE 55: CPT

## 2023-07-18 RX ORDER — FENOFIBRATE,MICRONIZED 130 MG
48 CAPSULE ORAL DAILY
Refills: 0 | Status: DISCONTINUED | OUTPATIENT
Start: 2023-07-18 | End: 2023-07-21

## 2023-07-18 RX ORDER — MORPHINE SULFATE 50 MG/1
2 CAPSULE, EXTENDED RELEASE ORAL EVERY 6 HOURS
Refills: 0 | Status: DISCONTINUED | OUTPATIENT
Start: 2023-07-18 | End: 2023-07-18

## 2023-07-18 RX ORDER — SIMVASTATIN 20 MG/1
20 TABLET, FILM COATED ORAL AT BEDTIME
Refills: 0 | Status: DISCONTINUED | OUTPATIENT
Start: 2023-07-18 | End: 2023-07-21

## 2023-07-18 RX ORDER — ASPIRIN/CALCIUM CARB/MAGNESIUM 324 MG
81 TABLET ORAL DAILY
Refills: 0 | Status: DISCONTINUED | OUTPATIENT
Start: 2023-07-18 | End: 2023-07-21

## 2023-07-18 RX ORDER — LABETALOL HCL 100 MG
100 TABLET ORAL EVERY 8 HOURS
Refills: 0 | Status: DISCONTINUED | OUTPATIENT
Start: 2023-07-18 | End: 2023-07-21

## 2023-07-18 RX ORDER — PANTOPRAZOLE SODIUM 20 MG/1
40 TABLET, DELAYED RELEASE ORAL
Refills: 0 | Status: DISCONTINUED | OUTPATIENT
Start: 2023-07-18 | End: 2023-07-21

## 2023-07-18 RX ORDER — POLYETHYLENE GLYCOL 3350 17 G/17G
17 POWDER, FOR SOLUTION ORAL DAILY
Refills: 0 | Status: DISCONTINUED | OUTPATIENT
Start: 2023-07-18 | End: 2023-07-21

## 2023-07-18 RX ORDER — ASPIRIN/CALCIUM CARB/MAGNESIUM 324 MG
1 TABLET ORAL
Refills: 0 | DISCHARGE

## 2023-07-18 RX ORDER — HYDROMORPHONE HYDROCHLORIDE 2 MG/ML
1 INJECTION INTRAMUSCULAR; INTRAVENOUS; SUBCUTANEOUS ONCE
Refills: 0 | Status: DISCONTINUED | OUTPATIENT
Start: 2023-07-18 | End: 2023-07-18

## 2023-07-18 RX ORDER — LISINOPRIL 2.5 MG/1
20 TABLET ORAL DAILY
Refills: 0 | Status: DISCONTINUED | OUTPATIENT
Start: 2023-07-18 | End: 2023-07-21

## 2023-07-18 RX ORDER — SUCRALFATE 1 G
1 TABLET ORAL
Refills: 0 | Status: DISCONTINUED | OUTPATIENT
Start: 2023-07-18 | End: 2023-07-21

## 2023-07-18 RX ORDER — AMLODIPINE BESYLATE 2.5 MG/1
5 TABLET ORAL DAILY
Refills: 0 | Status: DISCONTINUED | OUTPATIENT
Start: 2023-07-18 | End: 2023-07-21

## 2023-07-18 RX ORDER — SODIUM CHLORIDE 9 MG/ML
1000 INJECTION INTRAMUSCULAR; INTRAVENOUS; SUBCUTANEOUS
Refills: 0 | Status: DISCONTINUED | OUTPATIENT
Start: 2023-07-18 | End: 2023-07-21

## 2023-07-18 RX ORDER — GABAPENTIN 400 MG/1
100 CAPSULE ORAL THREE TIMES A DAY
Refills: 0 | Status: DISCONTINUED | OUTPATIENT
Start: 2023-07-18 | End: 2023-07-21

## 2023-07-18 RX ORDER — HEPARIN SODIUM 5000 [USP'U]/ML
5000 INJECTION INTRAVENOUS; SUBCUTANEOUS EVERY 12 HOURS
Refills: 0 | Status: DISCONTINUED | OUTPATIENT
Start: 2023-07-18 | End: 2023-07-21

## 2023-07-18 RX ADMIN — HYDROMORPHONE HYDROCHLORIDE 1 MILLIGRAM(S): 2 INJECTION INTRAMUSCULAR; INTRAVENOUS; SUBCUTANEOUS at 10:10

## 2023-07-18 RX ADMIN — Medication 1 GRAM(S): at 17:22

## 2023-07-18 RX ADMIN — HYDROMORPHONE HYDROCHLORIDE 1 MILLIGRAM(S): 2 INJECTION INTRAMUSCULAR; INTRAVENOUS; SUBCUTANEOUS at 09:37

## 2023-07-18 RX ADMIN — Medication 100 MILLIGRAM(S): at 14:02

## 2023-07-18 RX ADMIN — LISINOPRIL 20 MILLIGRAM(S): 2.5 TABLET ORAL at 06:44

## 2023-07-18 RX ADMIN — Medication 48 MILLIGRAM(S): at 11:53

## 2023-07-18 RX ADMIN — AMLODIPINE BESYLATE 5 MILLIGRAM(S): 2.5 TABLET ORAL at 06:43

## 2023-07-18 RX ADMIN — Medication 81 MILLIGRAM(S): at 11:53

## 2023-07-18 RX ADMIN — SODIUM CHLORIDE 100 MILLILITER(S): 9 INJECTION INTRAMUSCULAR; INTRAVENOUS; SUBCUTANEOUS at 09:38

## 2023-07-18 RX ADMIN — HEPARIN SODIUM 5000 UNIT(S): 5000 INJECTION INTRAVENOUS; SUBCUTANEOUS at 17:20

## 2023-07-18 RX ADMIN — Medication 100 MILLIGRAM(S): at 06:36

## 2023-07-18 RX ADMIN — SIMVASTATIN 20 MILLIGRAM(S): 20 TABLET, FILM COATED ORAL at 21:17

## 2023-07-18 RX ADMIN — GABAPENTIN 100 MILLIGRAM(S): 400 CAPSULE ORAL at 14:02

## 2023-07-18 RX ADMIN — Medication 1 GRAM(S): at 14:02

## 2023-07-18 RX ADMIN — Medication 100 MILLIGRAM(S): at 21:17

## 2023-07-18 RX ADMIN — HEPARIN SODIUM 5000 UNIT(S): 5000 INJECTION INTRAVENOUS; SUBCUTANEOUS at 06:41

## 2023-07-18 RX ADMIN — POLYETHYLENE GLYCOL 3350 17 GRAM(S): 17 POWDER, FOR SOLUTION ORAL at 11:54

## 2023-07-18 RX ADMIN — PANTOPRAZOLE SODIUM 40 MILLIGRAM(S): 20 TABLET, DELAYED RELEASE ORAL at 17:20

## 2023-07-18 RX ADMIN — Medication 1: at 08:17

## 2023-07-18 RX ADMIN — GABAPENTIN 100 MILLIGRAM(S): 400 CAPSULE ORAL at 21:17

## 2023-07-18 RX ADMIN — Medication 2: at 16:20

## 2023-07-18 RX ADMIN — GABAPENTIN 100 MILLIGRAM(S): 400 CAPSULE ORAL at 06:43

## 2023-07-18 NOTE — CONSULT NOTE ADULT - NS ATTEND AMEND GEN_ALL_CORE FT
Patient seen and examined. Pt presenting with n/v and abd pain, found to have cholelithiasis and GB thickening, surgery following. GI consulted for pancreatic lesion and also concomitant GERD symptoms. CT reviewed, small enhancing lesion in the tail with peripancreatic adenopathy. Recommend MRI/MRCP w/ contrast to evaluate.     Total time spent to complete patient's bedside assessment, physical examination, review medical chart including labs & imaging, discuss medical plan of care with housestaff was more than 50 minutes.

## 2023-07-18 NOTE — PROGRESS NOTE ADULT - PROBLEM SELECTOR PLAN 1
p/w intractable abd pain   CT A/P - Gallstones. Mild gallbladder wall thickening noted  RUQ sono- negative for acute cholecystitis.   - C/w Pain control   - C/w PPI  Surgery Following - no acute intervention f/u o.p  diet advanced and well tolerated   tentative plan for inpatient EGD to r/o PUD/Gastritis   SX signed off   GI consulted

## 2023-07-18 NOTE — PROGRESS NOTE ADULT - ASSESSMENT
HPI:  This is a 68 y/o M from home, with PMHx HTN, HLD, DM, and CKD who presents with abdominal pain. Pt states pain started 3 days ago and is located throughout the entire abdomen. Pt states pain sometimes radiates to the right shoulder area and he also feels a burning sensation in his epigastric area. Pt states pain is worse with eating and has been constant for the past 3 days. Pt endorses some nausea and fevers at home. Pt also states he has been drooling more at night time. Pt adds that he recently stopped taking carbidopa and levodopa after several years of taking it, due to his neurologist, Dr. Mcconnell recommending he stop. Last colonoscopy in 2008 was normal. No prior endoscopies. Pt denies any new foods, headaches, dizziness, CP, SOB, diarrhea, constipation, numbness or tingling.  (17 Jul 2023 23:31)  seen and examined known for uncontrolled DM, htn hld , ckd   started having abd pain all over abd without nausea vomiting  mild constipation  poor apetite x 4 days    pt is having severe Heart burns during night and during day time abd pain after he eats  not on nsaid  non etoh   drinks 2 cup of coffees /day.  came to [facility]  ct abd showed no obstruction   thickened GB  fullness of pancreatic tail some peripancreatic changes ( non etoh)   v sstable afebrile physical done   abd soft mild generalized tenderness  villatoro sign neg  mild epigastric tenderness    lungs  heart  cns ok  labs noted  a1c 8.9  creat 1.68  lipase nml  lactate nml    a/p abd pain ct abd showed r/o kristina but sono liver showed no acute kristina  seen by surgery and Gi    r/o gastric ulcer dx  on protonix and sucralfate   MRCP   ct abd showed scattered stool  one fleet enema

## 2023-07-18 NOTE — PROGRESS NOTE ADULT - ASSESSMENT
66 y/o M from home, with PMHx HTN, HLD, DM, and CKD who presents with Intractable abdominal pain. CT found cholelithiasis without acute cholecytitis Sx consulted. Admtted to medicine and sx cleared for outpatient follow up. GI consulted plan for MRCP to further eval   "Nonspecific 9 mm focus of nodular enhancement within the distal pancreatic tail region. " Tentative plan for EGD to eval for PUD/gastritis pending confirmation from GI

## 2023-07-18 NOTE — PROGRESS NOTE ADULT - SUBJECTIVE AND OBJECTIVE BOX
NP Note discussed with  primary attending    Patient is a 67y old  Male who presents with a chief complaint of intractable abdominal pain (18 Jul 2023 09:25)      INTERVAL HPI/OVERNIGHT EVENTS: persistant complaints of epigastric abd pain     MEDICATIONS  (STANDING):  amLODIPine   Tablet 5 milliGRAM(s) Oral daily  aspirin  chewable 81 milliGRAM(s) Oral daily  fenofibrate Tablet 48 milliGRAM(s) Oral daily  gabapentin 100 milliGRAM(s) Oral three times a day  heparin   Injectable 5000 Unit(s) SubCutaneous every 12 hours  insulin lispro (ADMELOG) corrective regimen sliding scale   SubCutaneous three times a day before meals  insulin lispro (ADMELOG) corrective regimen sliding scale   SubCutaneous at bedtime  labetalol 100 milliGRAM(s) Oral every 8 hours  lisinopril 20 milliGRAM(s) Oral daily  pantoprazole    Tablet 40 milliGRAM(s) Oral two times a day  polyethylene glycol 3350 17 Gram(s) Oral daily  simvastatin 20 milliGRAM(s) Oral at bedtime  sodium chloride 0.9%. 1000 milliLiter(s) (100 mL/Hr) IV Continuous <Continuous>  sucralfate 1 Gram(s) Oral four times a day    MEDICATIONS  (PRN):  acetaminophen     Tablet .. 650 milliGRAM(s) Oral every 6 hours PRN Temp greater or equal to 38C (100.4F), Mild Pain (1 - 3), Moderate Pain (4 - 6)  aluminum hydroxide/magnesium hydroxide/simethicone Suspension 30 milliLiter(s) Oral every 4 hours PRN Dyspepsia  morphine  - Injectable 2 milliGRAM(s) IV Push every 6 hours PRN Severe Pain (7 - 10)  ondansetron Injectable 4 milliGRAM(s) IV Push every 8 hours PRN Nausea and/or Vomiting      __________________________________________________  REVIEW OF SYSTEMS:    CONSTITUTIONAL: No fever,   EYES: no acute visual disturbances  NECK: No pain or stiffness  RESPIRATORY: No cough; No shortness of breath  CARDIOVASCULAR: No chest pain, no palpitations  GASTROINTESTINAL: + epigastric pain. No nausea or vomiting; No diarrhea   NEUROLOGICAL: No headache or numbness, no tremors  MUSCULOSKELETAL: No joint pain, no muscle pain  GENITOURINARY: no dysuria, no frequency, no hesitancy  PSYCHIATRY: no depression , no anxiety  ALL OTHER  ROS negative        Vital Signs Last 24 Hrs  T(C): 36.7 (18 Jul 2023 05:02), Max: 37.2 (17 Jul 2023 14:20)  T(F): 98 (18 Jul 2023 05:02), Max: 99 (17 Jul 2023 14:20)  HR: 66 (18 Jul 2023 06:38) (54 - 78)  BP: 137/77 (18 Jul 2023 06:38) (113/67 - 152/85)  BP(mean): 87 (18 Jul 2023 04:18) (87 - 87)  RR: 16 (18 Jul 2023 05:02) (16 - 18)  SpO2: 98% (18 Jul 2023 05:02) (95% - 98%)    Parameters below as of 18 Jul 2023 05:02  Patient On (Oxygen Delivery Method): room air        ________________________________________________  PHYSICAL EXAM:  GENERAL: NAD  HEENT: Normocephalic;  conjunctivae and sclerae clear;  NECK : supple  CHEST/LUNG: Clear to ausculitation bilaterally with good air entry   HEART: S1 S2  regular; no murmurs, gallops or rubs  ABDOMEN: obese Soft, +tender, Nondistended; Bowel sounds present  EXTREMITIES: no cyanosis; no edema; no calf tenderness  SKIN: warm and dry; no rash  NERVOUS SYSTEM:  Awake and alert; Oriented  to place, person and time     _________________________________________________  LABS:                        13.6   7.25  )-----------( 177      ( 18 Jul 2023 06:05 )             42.5     07-18    140  |  111<H>  |  21<H>  ----------------------------<  92  3.8   |  21<L>  |  1.68<H>    Ca    9.1      18 Jul 2023 06:05  Phos  3.3     07-18  Mg     2.4     07-18    TPro  7.0  /  Alb  3.7  /  TBili  0.7  /  DBili  x   /  AST  11  /  ALT  23  /  AlkPhos  52  07-18      Urinalysis Basic - ( 18 Jul 2023 06:05 )    Color: x / Appearance: x / SG: x / pH: x  Gluc: 92 mg/dL / Ketone: x  / Bili: x / Urobili: x   Blood: x / Protein: x / Nitrite: x   Leuk Esterase: x / RBC: x / WBC x   Sq Epi: x / Non Sq Epi: x / Bacteria: x      CAPILLARY BLOOD GLUCOSE      POCT Blood Glucose.: 114 mg/dL (18 Jul 2023 11:14)  POCT Blood Glucose.: 197 mg/dL (18 Jul 2023 08:07)    RADIOLOGY & ADDITIONAL TESTS: < from: US Abdomen Upper Quadrant Right (07.17.23 @ 18:10) >  IMPRESSION:  Gallstones without sonographic evidence of acute cholecystitis.    Fatty infiltration of the liver.        --- End of Report ---    < end of copied text >    < from: CT Abdomen and Pelvis w/ IV Cont (07.17.23 @ 15:20) >    IMPRESSION:  1. Gallstones. Mild gallbladder wall thickening noted. Correlate for   acute cholecystitis.  2. No evidence for mechanical bowel obstruction. No colonic wall   thickening. No evidence for acute appendicitis.  3. Nonspecific 9 mm focus of nodular enhancement within the distal   pancreatic tail region. Nonemergent contrast enhanced abdominal MRI   recommended for further assessment. 1.4 x 1.1 cm peripancreatic lymph   node (series 2, image 52)    < end of copied text >    Imaging Personally Reviewed:  YES/    Consultant(s) Notes Reviewed:   YES/    Care Discussed with Consultants : GI    Plan of care was discussed with patient and /or primary care giver; all questions and concerns were addressed and care was aligned with patient's wishes.

## 2023-07-18 NOTE — CONSULT NOTE ADULT - ASSESSMENT
Patient is a 67M with a PMHx of HTN, HLD, DM, depression, and CKD, who presented to the ED with abdominal pain. GI was consulted for abdominal pain.     Patient reports constant cramping diffuse pain x 4 days, exacerbated with PO intake, as well as burning sensation near his epigastrium and throat ("heat"). He endorses early satiety, decreased appetite, excessive salivation at night, halitosis, unable to taste, intermittent n/v (without hematemesis), stomach "heaviness", early satiety, restless abdomen. He denies cp, sob, lightheadedness, headache, dizziness, changes in stool caliber, dysphagia, odynophagia, diarrhea, unintentional weight loss, hematochezia, and melena. No prior EGD. Last colonoscopy >10 yrs ago wnl. No smoking/etoh/drug use. Denies family hx of liver disease or colorectal cancers or GI illnesses. No hx of autoimmune illnesses. Does not take herbal supplements. Denies excessive use of NSAIDs. Denies recent travel or viral illnesses.   Of note, per neurologist Dr. Mcconnell, he was instructed to stop taking carbidopa/levodopa 3 weeks ago (which he has been taking for 4 yrs, thought to have Parkinson disease but determined he just had tremors at baseline?).     In the ED, BUN 26, Cr 1.87, UA with 1000 glucose, otherwise no lactate 1.5, lipase 182 wnl, LFTs wnl.   CTAP notable for gallstones, mild GB wall thickening, hepatic steatosis, focal hepatic parenchymal calcification benign appearing, no ductal dilatation, nonspecific 9mm focus of nodular enhancement within distal PT region.  RUQUS shows gallstones without sonographic evidence of acute kristina, CBD 5mm, fatty liver. He was evaluated by surgery, no surgical intervention at this time, rec outpatient elective lap kristina.     #Abdominal pain  #Nausea/Vomiting  #Cholelithiasis  #Fatty liver  Patient presented with worsening constant diffuse cramping abdominal pain x 4 days associated with burning sensation near his epigastrium and throat as well as halitosis and intermittent n/v. No prior EGD. Imaging notable for cholelithiasis but no indication for acute cholecystitis. LFTs wnl, no biliary dilatation. Normal lipase on admission, less likely pancreatitis. Differentials for acute abdominal pain include esophagitis vs gastritis vs biliary colic (given cholelithiasis on imaging) vs peptic/duodenal ulcer disease vs GERD vs functional dyspepsia vs other. Patient may benefit from endoscopic evaluation.     	- Tentative EGD, pending endoscopy availability, TBD  	- Diet as tolerated  	- IV Protonix 40mg BID  	- Consider PO carafate 1g four times daily, 30 minutes before meals and snacks  	- Consider PRN Maalox q4h  	- Consider Miralax 1 packet daily given use of opiates for pain control    This note and its recommendations herein are preliminary until such time as cosigned by an attending.    GI will continue to follow.  Thank you for this consult! Patient is a 67M with a PMHx of HTN, HLD, DM, depression, and CKD, who presented to the ED with abdominal pain. GI was consulted for abdominal pain.     Patient reports constant cramping diffuse pain x 4 days, exacerbated with PO intake, as well as burning sensation near his epigastrium and throat ("heat"). He endorses early satiety, decreased appetite, excessive salivation at night, halitosis, unable to taste, intermittent n/v (without hematemesis), stomach "heaviness", early satiety, restless abdomen. He denies cp, sob, lightheadedness, headache, dizziness, changes in stool caliber, dysphagia, odynophagia, diarrhea, unintentional weight loss, hematochezia, and melena. No prior EGD. Last colonoscopy >10 yrs ago wnl. No smoking/etoh/drug use. Denies family hx of liver disease or colorectal cancers or GI illnesses. No hx of autoimmune illnesses. Does not take herbal supplements. Denies excessive use of NSAIDs. Denies recent travel or viral illnesses.   Of note, per neurologist Dr. Mcconnell, he was instructed to stop taking carbidopa/levodopa 3 weeks ago (which he has been taking for 4 yrs, thought to have Parkinson disease but determined he just had tremors at baseline?).     In the ED, BUN 26, Cr 1.87, UA with 1000 glucose, otherwise no lactate 1.5, lipase 182 wnl, LFTs wnl.   CTAP notable for gallstones, mild GB wall thickening, hepatic steatosis, focal hepatic parenchymal calcification benign appearing, no ductal dilatation, nonspecific 9mm focus of nodular enhancement within distal PT region.  RUQUS shows gallstones without sonographic evidence of acute kristina, CBD 5mm, fatty liver. He was evaluated by surgery, no surgical intervention at this time, rec outpatient elective lap kristina.     #Abdominal pain  #Nausea/Vomiting  #Cholelithiasis  #Fatty liver  #Abnormal CT  Patient presented with worsening constant diffuse cramping abdominal pain x 4 days associated with burning sensation near his epigastrium and throat as well as halitosis and intermittent n/v. No prior EGD. Imaging notable for cholelithiasis but no indication for acute cholecystitis. LFTs wnl, no biliary dilatation. Normal lipase on admission, less likely pancreatitis. Differentials for acute abdominal pain include esophagitis vs gastritis vs biliary colic (given cholelithiasis on imaging) vs peptic/duodenal ulcer disease vs GERD vs functional dyspepsia vs other. Patient may benefit from endoscopic evaluation, ? inpatient vs outpatient. Of note, CTAP also notable for nonspecific 9mm focus of nodular enhancement within distal PT region as  well as 1.4 x 1.1cm peripancreatic lymph node, patient may benefit from MRI abd w/wo contrast for better visualization, pending MRI results, ? EUS.     	- Please obtain MRI abd w/wo contrast/MRCP for better visualization of pancreatic tail   	- Please obtain tumor markers: AFP, CEA, CA 19-9  	- Diet as tolerated  	- IV Protonix 40mg BID  	- Consider PO carafate 1g four times daily, 30 minutes before meals and snacks  	- Consider PRN Maalox q4h  	- Consider Miralax 1 packet daily given use of opiates for pain control  	- Pending MRI results, ? inpatient EGD     This note and its recommendations herein are preliminary until such time as cosigned by an attending.    GI will continue to follow.  Thank you for this consult! Patient is a 67M with a PMHx of HTN, HLD, DM, depression, and CKD, who presented to the ED with abdominal pain. GI was consulted for abdominal pain.     Patient reports constant cramping diffuse pain x 4 days, exacerbated with PO intake, as well as burning sensation near his epigastrium and throat ("heat"). He endorses early satiety, decreased appetite, excessive salivation at night, halitosis, unable to taste, intermittent n/v (without hematemesis), stomach "heaviness", early satiety, restless abdomen. He denies cp, sob, lightheadedness, headache, dizziness, changes in stool caliber, dysphagia, odynophagia, diarrhea, unintentional weight loss, hematochezia, and melena. No prior EGD. Last colonoscopy >10 yrs ago wnl. No smoking/etoh/drug use. Denies family hx of liver disease or colorectal cancers or GI illnesses. No hx of autoimmune illnesses. Does not take herbal supplements. Denies excessive use of NSAIDs. Denies recent travel or viral illnesses.   Of note, per neurologist Dr. Mcconnell, he was instructed to stop taking carbidopa/levodopa 3 weeks ago (which he has been taking for 4 yrs, thought to have Parkinson disease but determined he just had tremors at baseline?).     In the ED, BUN 26, Cr 1.87, UA with 1000 glucose, otherwise no lactate 1.5, lipase 182 wnl, LFTs wnl.   CTAP notable for gallstones, mild GB wall thickening, hepatic steatosis, focal hepatic parenchymal calcification benign appearing, no ductal dilatation, nonspecific 9mm focus of nodular enhancement within distal PT region.  RUQUS shows gallstones without sonographic evidence of acute kristina, CBD 5mm, fatty liver. He was evaluated by surgery, no surgical intervention at this time, rec outpatient elective lap kristina.     #Abdominal pain  #Nausea/Vomiting  #Cholelithiasis  #Fatty liver  #Abnormal CT  Patient presented with worsening constant diffuse cramping abdominal pain x 4 days associated with burning sensation near his epigastrium and throat as well as halitosis and intermittent n/v. No prior EGD. Imaging notable for cholelithiasis but no indication for acute cholecystitis. LFTs wnl, no biliary dilatation. Normal lipase on admission, less likely pancreatitis. Differentials for acute abdominal pain include esophagitis vs gastritis vs biliary colic (given cholelithiasis on imaging) vs peptic/duodenal ulcer disease vs GERD vs functional dyspepsia vs other. Patient may benefit from endoscopic evaluation, ? inpatient vs outpatient. Of note, CTAP also notable for nonspecific 9mm focus of nodular enhancement within distal PT region as  well as 1.4 x 1.1cm peripancreatic lymph node, patient may benefit from MRI abd w/wo contrast for better visualization, pending MRI results, ? EUS.     	- Please obtain MRI abd w/wo contrast/MRCP for better visualization of pancreatic tail   	- Please obtain tumor markers: AFP, CEA, CA 19-9  	- Diet as tolerated  	- IV Protonix 40mg BID  	- Consider PO carafate 1g four times daily, 30 minutes before meals and snacks  	- Consider PRN Maalox q4h  	- Consider Miralax 1 packet daily given use of opiates for pain control  	- Pending MRI results, ? inpatient EUS    This note and its recommendations herein are preliminary until such time as cosigned by an attending.    GI will continue to follow.  Thank you for this consult!

## 2023-07-18 NOTE — PATIENT PROFILE ADULT - FALL HARM RISK - HARM RISK INTERVENTIONS
Assistance with ambulation/Assistance OOB with selected safe patient handling equipment/Communicate Risk of Fall with Harm to all staff/Discuss with provider need for PT consult/Monitor gait and stability/Provide patient with walking aids - walker, cane, crutches/Reinforce activity limits and safety measures with patient and family/Reorient to person, place and time as needed/Review medications for side effects contributing to fall risk/Sit up slowly, dangle for a short time, stand at bedside before walking/Tailored Fall Risk Interventions/Use of alarms - bed, chair and/or voice tab/Visual Cue: Yellow wristband and red socks/Bed in lowest position, wheels locked, appropriate side rails in place/Call bell, personal items and telephone in reach/Instruct patient to call for assistance before getting out of bed or chair/Non-slip footwear when patient is out of bed/[place] to call system/Physically safe environment - no spills, clutter or unnecessary equipment/Purposeful Proactive Rounding/Room/bathroom lighting operational, light cord in reach

## 2023-07-18 NOTE — PROGRESS NOTE ADULT - SUBJECTIVE AND OBJECTIVE BOX
HPI:  This is a 66 y/o M from home, with PMHx HTN, HLD, DM, and CKD who presents with abdominal pain. Pt states pain started 3 days ago and is located throughout the entire abdomen. Pt states pain sometimes radiates to the right shoulder area and he also feels a burning sensation in his epigastric area. Pt states pain is worse with eating and has been constant for the past 3 days. Pt endorses some nausea and fevers at home. Pt also states he has been drooling more at night time. Pt adds that he recently stopped taking carbidopa and levodopa after several years of taking it, due to his neurologist, Dr. Mcconnell recommending he stop. Last colonoscopy in 2008 was normal. No prior endoscopies. Pt denies any new foods, headaches, dizziness, CP, SOB, diarrhea, constipation, numbness or tingling.  (17 Jul 2023 23:31)      Patient is a 67y old  Male who presents with a chief complaint of intractable abdominal pain (18 Jul 2023 12:01)      INTERVAL HPI/OVERNIGHT EVENTS:  T(C): 36.7 (07-18-23 @ 05:02), Max: 37.2 (07-17-23 @ 14:20)  HR: 66 (07-18-23 @ 06:38) (54 - 67)  BP: 137/77 (07-18-23 @ 06:38) (113/67 - 152/85)  RR: 16 (07-18-23 @ 05:02) (16 - 18)  SpO2: 98% (07-18-23 @ 05:02) (95% - 98%)  Wt(kg): --  I&O's Summary      REVIEW OF SYSTEMS: denies fever, chills, SOB, palpitations, chest pain, abdominal pain, nausea, vomitting, diarrhea, constipation, dizziness    MEDICATIONS  (STANDING):  amLODIPine   Tablet 5 milliGRAM(s) Oral daily  aspirin  chewable 81 milliGRAM(s) Oral daily  fenofibrate Tablet 48 milliGRAM(s) Oral daily  gabapentin 100 milliGRAM(s) Oral three times a day  heparin   Injectable 5000 Unit(s) SubCutaneous every 12 hours  insulin lispro (ADMELOG) corrective regimen sliding scale   SubCutaneous three times a day before meals  insulin lispro (ADMELOG) corrective regimen sliding scale   SubCutaneous at bedtime  labetalol 100 milliGRAM(s) Oral every 8 hours  lisinopril 20 milliGRAM(s) Oral daily  pantoprazole    Tablet 40 milliGRAM(s) Oral two times a day  polyethylene glycol 3350 17 Gram(s) Oral daily  saline laxative (FLEET) Rectal Enema 1 Enema Rectal once  simvastatin 20 milliGRAM(s) Oral at bedtime  sodium chloride 0.9%. 1000 milliLiter(s) (100 mL/Hr) IV Continuous <Continuous>  sucralfate 1 Gram(s) Oral four times a day    MEDICATIONS  (PRN):  acetaminophen     Tablet .. 650 milliGRAM(s) Oral every 6 hours PRN Temp greater or equal to 38C (100.4F), Mild Pain (1 - 3), Moderate Pain (4 - 6)  aluminum hydroxide/magnesium hydroxide/simethicone Suspension 30 milliLiter(s) Oral every 4 hours PRN Dyspepsia  morphine  - Injectable 2 milliGRAM(s) IV Push every 6 hours PRN Severe Pain (7 - 10)  ondansetron Injectable 4 milliGRAM(s) IV Push every 8 hours PRN Nausea and/or Vomiting      PHYSICAL EXAM:  GENERAL: NAD, well-groomed, well-developed  HEAD:  Atraumatic, Normocephalic  EYES: EOMI, PERRLA, conjunctiva and sclera clear  ENMT: No tonsillar erythema, exudates, or enlargement; Moist mucous membranes, Good dentition, No lesions  NECK: Supple, No JVD, Normal thyroid  NERVOUS SYSTEM:  Alert & Oriented X3, Good concentration; Motor Strength 5/5 B/L upper and lower extremities; DTRs 2+ intact and symmetric  CHEST/LUNG: Clear to percussion bilaterally; No rales, rhonchi, wheezing, or rubs  HEART: Regular rate and rhythm; No murmurs, rubs, or gallops  ABDOMEN: Soft, Nontender, Nondistended; Bowel sounds present  EXTREMITIES:  2+ Peripheral Pulses, No clubbing, cyanosis, or edema  LYMPH: No lymphadenopathy noted  SKIN: No rashes or lesions  LABS:                        13.6   7.25  )-----------( 177      ( 18 Jul 2023 06:05 )             42.5     07-18    140  |  111<H>  |  21<H>  ----------------------------<  92  3.8   |  21<L>  |  1.68<H>    Ca    9.1      18 Jul 2023 06:05  Phos  3.3     07-18  Mg     2.4     07-18    TPro  7.0  /  Alb  3.7  /  TBili  0.7  /  DBili  x   /  AST  11  /  ALT  23  /  AlkPhos  52  07-18      Urinalysis Basic - ( 18 Jul 2023 06:05 )    Color: x / Appearance: x / SG: x / pH: x  Gluc: 92 mg/dL / Ketone: x  / Bili: x / Urobili: x   Blood: x / Protein: x / Nitrite: x   Leuk Esterase: x / RBC: x / WBC x   Sq Epi: x / Non Sq Epi: x / Bacteria: x      CAPILLARY BLOOD GLUCOSE      POCT Blood Glucose.: 114 mg/dL (18 Jul 2023 11:14)  POCT Blood Glucose.: 197 mg/dL (18 Jul 2023 08:07)        Urinalysis Basic - ( 18 Jul 2023 06:05 )    Color: x / Appearance: x / SG: x / pH: x  Gluc: 92 mg/dL / Ketone: x  / Bili: x / Urobili: x   Blood: x / Protein: x / Nitrite: x   Leuk Esterase: x / RBC: x / WBC x   Sq Epi: x / Non Sq Epi: x / Bacteria: x

## 2023-07-18 NOTE — CONSULT NOTE ADULT - SUBJECTIVE AND OBJECTIVE BOX
[facility] GI CONSULTATION    Patient is a 67y old  Male who presents with a chief complaint of intractable abdominal pain (17 Jul 2023 23:31)    HPI:  This is a 66 y/o M from home, with PMHx HTN, HLD, DM, and CKD who presents with abdominal pain. Pt states pain started 3 days ago and is located throughout the entire abdomen. Pt states pain sometimes radiates to the right shoulder area and he also feels a burning sensation in his epigastric area. Pt states pain is worse with eating and has been constant for the past 3 days. Pt endorses some nausea and fevers at home. Pt also states he has been drooling more at night time. Pt adds that he recently stopped taking carbidopa and levodopa after several years of taking it, due to his neurologist, Dr. Mcconnell recommending he stop. Last colonoscopy in 2008 was normal. No prior endoscopies. Pt denies any new foods, headaches, dizziness, CP, SOB, diarrhea, constipation, numbness or tingling.  (17 Jul 2023 23:31)        PMH/PSH:  PAST MEDICAL & SURGICAL HISTORY:  HTN (hypertension)      DM (diabetes mellitus)      Back pain, chronic      HLD (hyperlipidemia)      CKD (chronic kidney disease)            FH:  FAMILY HISTORY:  Family history of diabetes mellitus (DM) (Mother, Sibling)          MEDS:  MEDICATIONS  (STANDING):  amLODIPine   Tablet 5 milliGRAM(s) Oral daily  aspirin  chewable 81 milliGRAM(s) Oral daily  fenofibrate Tablet 48 milliGRAM(s) Oral daily  gabapentin 100 milliGRAM(s) Oral three times a day  heparin   Injectable 5000 Unit(s) SubCutaneous every 12 hours  HYDROmorphone  Injectable 1 milliGRAM(s) IV Push once  insulin lispro (ADMELOG) corrective regimen sliding scale   SubCutaneous three times a day before meals  insulin lispro (ADMELOG) corrective regimen sliding scale   SubCutaneous at bedtime  labetalol 100 milliGRAM(s) Oral every 8 hours  lisinopril 20 milliGRAM(s) Oral daily  simvastatin 20 milliGRAM(s) Oral at bedtime  sodium chloride 0.9%. 1000 milliLiter(s) (100 mL/Hr) IV Continuous <Continuous>    MEDICATIONS  (PRN):  acetaminophen     Tablet .. 650 milliGRAM(s) Oral every 6 hours PRN Temp greater or equal to 38C (100.4F), Mild Pain (1 - 3), Moderate Pain (4 - 6)  aluminum hydroxide/magnesium hydroxide/simethicone Suspension 30 milliLiter(s) Oral every 4 hours PRN Dyspepsia  morphine  - Injectable 2 milliGRAM(s) IV Push every 6 hours PRN Severe Pain (7 - 10)  ondansetron Injectable 4 milliGRAM(s) IV Push every 8 hours PRN Nausea and/or Vomiting    Allergies    metformin (Unknown)    Intolerances          ROS: A detailed set of ROS were asked and negative except those outlined in GI HPI.  ______________________________________________________________________  PHYSICAL EXAM:  T(C): 36.7 (07-18-23 @ 05:02), Max: 37.2 (07-17-23 @ 14:20)  HR: 66 (07-18-23 @ 06:38)  BP: 137/77 (07-18-23 @ 06:38)  RR: 16 (07-18-23 @ 05:02)  SpO2: 98% (07-18-23 @ 05:02)  Wt(kg): --      GEN: NAD  HEENT: EOMI, conjunctivae anicteric, neck supple, dry mucous membranes  PULM: LSCTAB, no wheezing, rales, or rhonchi  CV: RRR, no m/r/b  GI: Soft, mildly tender diffusely, ND; +BS in all four quadrants, no ascites, no Doe's sign  MSK: ROBERTO, no edema  NEURO: A&O x 3, no gross deficits  ______________________________________________________________________  LABS:                        13.6   7.25  )-----------( 177      ( 18 Jul 2023 06:05 )             42.5     07-18    140  |  111<H>  |  21<H>  ----------------------------<  92  3.8   |  21<L>  |  1.68<H>    Ca    9.1      18 Jul 2023 06:05  Phos  3.3     07-18  Mg     2.4     07-18    TPro  7.0  /  Alb  3.7  /  TBili  0.7  /  DBili  x   /  AST  11  /  ALT  23  /  AlkPhos  52  07-18    LIVER FUNCTIONS - ( 18 Jul 2023 06:05 )  Alb: 3.7 g/dL / Pro: 7.0 g/dL / ALK PHOS: 52 U/L / ALT: 23 U/L DA / AST: 11 U/L / GGT: x             ____________________________________________    IMAGING:    < from: CT Abdomen and Pelvis w/ IV Cont (07.17.23 @ 15:20) >  CT ABDOMEN AND PELVIS IC   ORDERED BY: MARILEE PRINCE     PROCEDURE DATE:  07/17/2023          INTERPRETATION:  CLINICAL INFORMATION: Abdominal pain and distention.    COMPARISON: None.    CONTRAST/COMPLICATIONS:  IV Contrast: Omnipaque 350  90 cc administered   10 cc discarded  Oral Contrast: NONE  Complications: None reported at time of study completion    PROCEDURE:  CT of the Abdomen and Pelvis was performed.  Sagittal and coronal reformats were performed.    FINDINGS:  LOWER CHEST: No significant pleural or parenchymal abnormalities   identified.    LIVER: The liver is normal in size. Decreased attenuation is identified   suggesting hepatic steatosis. Focal hepatic parenchymal calcification,   benign-appearing.  BILE DUCTS: Normal caliber.  GALLBLADDER: Gallstones. Mild gallbladder wall thickening noted.   Correlate for acute cholecystitis.  SPLEEN: Within normal limits.  PANCREAS: Nonspecific 9 mm focus of nodular enhancement within the distal   pancreatic tail region. Nonemergent contrast enhanced abdominal MRI   recommended for further assessment.  ADRENALS: Within normal limits.  KIDNEYS/URETERS: No hydronephrosis. No renal calculi. No space-occupying   lesions.    BLADDER: Within normal limits.  REPRODUCTIVE ORGANS: Prostate within normal limits.    BOWEL: Fecal material scattered throughout the colon. No evidence for   mechanical bowel obstruction. No colonic wall thickening. Appendix is   normal.  PERITONEUM: No ascites.  VESSELS: Atherosclerotic changes.  RETROPERITONEUM/LYMPH NODES: 1.4 x 1.1 cm peripancreatic lymph node   (series 2, image 52)  ABDOMINAL WALL: Fat containing umbilical hernia.  BONES: Degenerative changes. Intervertebral disc space narrowing   identified at L5-S1.    IMPRESSION:  1. Gallstones. Mild gallbladder wall thickening noted. Correlate for   acute cholecystitis.  2. No evidence for mechanical bowel obstruction. No colonic wall   thickening. No evidence for acute appendicitis.  3. Nonspecific 9 mm focus of nodular enhancement within the distal   pancreatic tail region. Nonemergent contrast enhanced abdominal MRI   recommended for further assessment. 1.4 x 1.1 cm peripancreatic lymph   node (series 2, image 52)    < end of copied text >    < from: US Abdomen Upper Quadrant Right (07.17.23 @ 18:10) >  US ABDOMEN RT UPR QUADRANT   ORDERED BY: MARILEE PRINCE     PROCEDURE DATE:  07/17/2023          INTERPRETATION:  CLINICAL INFORMATION: Mid abdominal pain    COMPARISON: CT abdomen pelvis performed on the same day.    TECHNIQUE: Sonography of the right upper quadrant.    FINDINGS:  Liver: Increased echogenicity.  Bile ducts: Normal caliber. Common bile duct measures 5 mm.  Gallbladder: Gallstones without wall thickening or pericholecystic fluid.   Sonographic Doe's sign is negative.  Pancreas: Visualized portions are within normal limits.  Right kidney: 10.3 cm. No hydronephrosis.  Ascites: None.  IVC: Visualized portions are within normal limits.    IMPRESSION:  Gallstones without sonographic evidence of acute cholecystitis.    Fatty infiltration of the liver.      < end of copied text >

## 2023-07-19 DIAGNOSIS — K80.20 CALCULUS OF GALLBLADDER WITHOUT CHOLECYSTITIS WITHOUT OBSTRUCTION: ICD-10-CM

## 2023-07-19 LAB
ANION GAP SERPL CALC-SCNC: 8 MMOL/L — SIGNIFICANT CHANGE UP (ref 5–17)
BUN SERPL-MCNC: 20 MG/DL — HIGH (ref 7–18)
CALCIUM SERPL-MCNC: 9.1 MG/DL — SIGNIFICANT CHANGE UP (ref 8.4–10.5)
CHLORIDE SERPL-SCNC: 107 MMOL/L — SIGNIFICANT CHANGE UP (ref 96–108)
CO2 SERPL-SCNC: 22 MMOL/L — SIGNIFICANT CHANGE UP (ref 22–31)
CREAT SERPL-MCNC: 1.67 MG/DL — HIGH (ref 0.5–1.3)
EGFR: 44 ML/MIN/1.73M2 — LOW
GLUCOSE BLDC GLUCOMTR-MCNC: 121 MG/DL — HIGH (ref 70–99)
GLUCOSE BLDC GLUCOMTR-MCNC: 127 MG/DL — HIGH (ref 70–99)
GLUCOSE BLDC GLUCOMTR-MCNC: 194 MG/DL — HIGH (ref 70–99)
GLUCOSE BLDC GLUCOMTR-MCNC: 217 MG/DL — HIGH (ref 70–99)
GLUCOSE SERPL-MCNC: 131 MG/DL — HIGH (ref 70–99)
HCT VFR BLD CALC: 44.5 % — SIGNIFICANT CHANGE UP (ref 39–50)
HGB BLD-MCNC: 14.2 G/DL — SIGNIFICANT CHANGE UP (ref 13–17)
MCHC RBC-ENTMCNC: 25.7 PG — LOW (ref 27–34)
MCHC RBC-ENTMCNC: 31.9 GM/DL — LOW (ref 32–36)
MCV RBC AUTO: 80.6 FL — SIGNIFICANT CHANGE UP (ref 80–100)
NRBC # BLD: 0 /100 WBCS — SIGNIFICANT CHANGE UP (ref 0–0)
PLATELET # BLD AUTO: 191 K/UL — SIGNIFICANT CHANGE UP (ref 150–400)
POTASSIUM SERPL-MCNC: 3.8 MMOL/L — SIGNIFICANT CHANGE UP (ref 3.5–5.3)
POTASSIUM SERPL-SCNC: 3.8 MMOL/L — SIGNIFICANT CHANGE UP (ref 3.5–5.3)
RBC # BLD: 5.52 M/UL — SIGNIFICANT CHANGE UP (ref 4.2–5.8)
RBC # FLD: 14.4 % — SIGNIFICANT CHANGE UP (ref 10.3–14.5)
SODIUM SERPL-SCNC: 137 MMOL/L — SIGNIFICANT CHANGE UP (ref 135–145)
WBC # BLD: 6.26 K/UL — SIGNIFICANT CHANGE UP (ref 3.8–10.5)
WBC # FLD AUTO: 6.26 K/UL — SIGNIFICANT CHANGE UP (ref 3.8–10.5)

## 2023-07-19 PROCEDURE — 99232 SBSQ HOSP IP/OBS MODERATE 35: CPT

## 2023-07-19 RX ADMIN — LISINOPRIL 20 MILLIGRAM(S): 2.5 TABLET ORAL at 06:09

## 2023-07-19 RX ADMIN — HEPARIN SODIUM 5000 UNIT(S): 5000 INJECTION INTRAVENOUS; SUBCUTANEOUS at 06:09

## 2023-07-19 RX ADMIN — GABAPENTIN 100 MILLIGRAM(S): 400 CAPSULE ORAL at 06:09

## 2023-07-19 RX ADMIN — HEPARIN SODIUM 5000 UNIT(S): 5000 INJECTION INTRAVENOUS; SUBCUTANEOUS at 17:39

## 2023-07-19 RX ADMIN — Medication 100 MILLIGRAM(S): at 06:11

## 2023-07-19 RX ADMIN — Medication 1 GRAM(S): at 13:26

## 2023-07-19 RX ADMIN — Medication 650 MILLIGRAM(S): at 01:11

## 2023-07-19 RX ADMIN — AMLODIPINE BESYLATE 5 MILLIGRAM(S): 2.5 TABLET ORAL at 06:09

## 2023-07-19 RX ADMIN — Medication 48 MILLIGRAM(S): at 13:27

## 2023-07-19 RX ADMIN — Medication 650 MILLIGRAM(S): at 10:05

## 2023-07-19 RX ADMIN — GABAPENTIN 100 MILLIGRAM(S): 400 CAPSULE ORAL at 13:29

## 2023-07-19 RX ADMIN — SIMVASTATIN 20 MILLIGRAM(S): 20 TABLET, FILM COATED ORAL at 22:12

## 2023-07-19 RX ADMIN — Medication 1 GRAM(S): at 17:39

## 2023-07-19 RX ADMIN — Medication 1 GRAM(S): at 00:21

## 2023-07-19 RX ADMIN — PANTOPRAZOLE SODIUM 40 MILLIGRAM(S): 20 TABLET, DELAYED RELEASE ORAL at 06:09

## 2023-07-19 RX ADMIN — Medication 100 MILLIGRAM(S): at 13:27

## 2023-07-19 RX ADMIN — GABAPENTIN 100 MILLIGRAM(S): 400 CAPSULE ORAL at 22:12

## 2023-07-19 RX ADMIN — Medication 1 GRAM(S): at 06:10

## 2023-07-19 RX ADMIN — Medication 100 MILLIGRAM(S): at 22:12

## 2023-07-19 RX ADMIN — Medication 81 MILLIGRAM(S): at 13:26

## 2023-07-19 RX ADMIN — PANTOPRAZOLE SODIUM 40 MILLIGRAM(S): 20 TABLET, DELAYED RELEASE ORAL at 17:39

## 2023-07-19 NOTE — PROGRESS NOTE ADULT - SUBJECTIVE AND OBJECTIVE BOX
S: C/o epigastric pain even prior to eating.     O:   Vital Signs Last 24 Hrs  T(C): 36.6 (19 Jul 2023 05:32), Max: 37.1 (18 Jul 2023 14:13)  T(F): 97.9 (19 Jul 2023 05:32), Max: 98.7 (18 Jul 2023 14:13)  HR: 56 (19 Jul 2023 05:32) (56 - 62)  BP: 157/83 (19 Jul 2023 05:32) (134/74 - 157/83)  BP(mean): --  RR: 17 (19 Jul 2023 05:32) (16 - 17)  SpO2: 95% (19 Jul 2023 05:32) (95% - 98%)    Parameters below as of 19 Jul 2023 05:32  Patient On (Oxygen Delivery Method): room air      Exam:   General: awake, NAD  Resp: nonlabored  Abd: soft, no tenderness to RUQ; no guarding      LABS:                      14.2   6.26  )-----------( 191      ( 19 Jul 2023 05:59 )             44.5     Basic Metabolic Panel in AM (07.19.23 @ 05:59)    Sodium: 137 mmol/L   Potassium: 3.8 mmol/L   Chloride: 107 mmol/L   Carbon Dioxide: 22 mmol/L   Anion Gap: 8 mmol/L   Blood Urea Nitrogen: 20 mg/dL   Creatinine: 1.67 mg/dL   Glucose: 131 mg/dL   Calcium: 9.1 mg/dL   eGFR: 44: The estimated glomerular filtration rate (eGFR) is calculated using the  2021 CKD-EPI creatinine equation, which does not have a coefficient for  race and is validated in individuals 18 years of age and older (N Engl J  Med 2021; 385:5380-2216). Creatinine-based eGFR may be inaccurate in  various situations including but not limited to extremes of muscle mass,  altered dietary protein intake, or medications that affect renal tubular  creatinine secretion. mL/min/1.73m2

## 2023-07-19 NOTE — PROGRESS NOTE ADULT - ASSESSMENT
67M with abdominal pain   with cholelithiasis    MRI and workup per GI recs  Continue PPI  Pain control   Likely outpatient f/u for elective lap kristina with Dr. Noriega  Discussed with Dr. Noriega

## 2023-07-19 NOTE — PROGRESS NOTE ADULT - SUBJECTIVE AND OBJECTIVE BOX
GI Progress Note    Patient is a 67y old  Male who presents with a chief complaint of intractable abdominal pain (19 Jul 2023 08:20)    GI was consulted for abdominal pain and pancreatic lesion.    24-HOUR INTERVAL EVENTS: Patient resting in bed, pending MRI. Discussed tentative EGD/EUS this Friday pending MRCP results. Patient aware & agreeable, questions answered. Offers no acute complaints, abd pain unchanged but manageable, endorses having an appetite otherwise denies n/v/d, BM x 1 last night, soft-formed, denies hematochezia or melena.     MEDICATIONS  (STANDING):  amLODIPine   Tablet 5 milliGRAM(s) Oral daily  aspirin  chewable 81 milliGRAM(s) Oral daily  fenofibrate Tablet 48 milliGRAM(s) Oral daily  gabapentin 100 milliGRAM(s) Oral three times a day  heparin   Injectable 5000 Unit(s) SubCutaneous every 12 hours  insulin lispro (ADMELOG) corrective regimen sliding scale   SubCutaneous three times a day before meals  insulin lispro (ADMELOG) corrective regimen sliding scale   SubCutaneous at bedtime  labetalol 100 milliGRAM(s) Oral every 8 hours  lisinopril 20 milliGRAM(s) Oral daily  pantoprazole    Tablet 40 milliGRAM(s) Oral two times a day  polyethylene glycol 3350 17 Gram(s) Oral daily  saline laxative (FLEET) Rectal Enema 1 Enema Rectal once  simvastatin 20 milliGRAM(s) Oral at bedtime  sodium chloride 0.9%. 1000 milliLiter(s) (100 mL/Hr) IV Continuous <Continuous>  sucralfate 1 Gram(s) Oral four times a day    MEDICATIONS  (PRN):  acetaminophen     Tablet .. 650 milliGRAM(s) Oral every 6 hours PRN Temp greater or equal to 38C (100.4F), Mild Pain (1 - 3), Moderate Pain (4 - 6)  aluminum hydroxide/magnesium hydroxide/simethicone Suspension 30 milliLiter(s) Oral every 4 hours PRN Dyspepsia  ondansetron Injectable 4 milliGRAM(s) IV Push every 8 hours PRN Nausea and/or Vomiting    __________________________________________________  REVIEW OF SYSTEMS:  A detailed set of ROS were asked and negative except those outlined in GI HPI above/below.   ________________________________________________  PHYSICAL EXAM    Vital Signs Last 24 Hrs  T(C): 36.6 (19 Jul 2023 05:32), Max: 37.1 (18 Jul 2023 14:13)  T(F): 97.9 (19 Jul 2023 05:32), Max: 98.7 (18 Jul 2023 14:13)  HR: 56 (19 Jul 2023 05:32) (56 - 62)  BP: 157/83 (19 Jul 2023 05:32) (134/74 - 157/83)  BP(mean): --  RR: 17 (19 Jul 2023 05:32) (16 - 17)  SpO2: 95% (19 Jul 2023 05:32) (95% - 98%)    Parameters below as of 19 Jul 2023 05:32  Patient On (Oxygen Delivery Method): room air      GEN: NAD  HEENT: EOMI, conjunctivae anicteric, neck supple, dry mucous membranes  PULM: LSCTAB, no wheezing, rales, or rhonchi  CV: RRR, no m/r/b  GI: Soft, mildly tender diffusely, ND; +BS in all four quadrants, no ascites, no Doe's sign  MSK: ROBERTO, no edema  NEURO: A&O x 3, no gross deficits  _________________________________________________  LABS:                        14.2   6.26  )-----------( 191      ( 19 Jul 2023 05:59 )             44.5     07-19    137  |  107  |  20<H>  ----------------------------<  131<H>  3.8   |  22  |  1.67<H>    Ca    9.1      19 Jul 2023 05:59  Phos  3.3     07-18  Mg     2.4     07-18    TPro  7.0  /  Alb  3.7  /  TBili  0.7  /  DBili  x   /  AST  11  /  ALT  23  /  AlkPhos  52  07-18      Urinalysis Basic - ( 19 Jul 2023 05:59 )    Color: x / Appearance: x / SG: x / pH: x  Gluc: 131 mg/dL / Ketone: x  / Bili: x / Urobili: x   Blood: x / Protein: x / Nitrite: x   Leuk Esterase: x / RBC: x / WBC x   Sq Epi: x / Non Sq Epi: x / Bacteria: x      CAPILLARY BLOOD GLUCOSE      POCT Blood Glucose.: 121 mg/dL (19 Jul 2023 08:06)  POCT Blood Glucose.: 232 mg/dL (18 Jul 2023 21:15)  POCT Blood Glucose.: 224 mg/dL (18 Jul 2023 16:05)  POCT Blood Glucose.: 114 mg/dL (18 Jul 2023 11:14)        RADIOLOGY & ADDITIONAL TESTS: Reviewed.

## 2023-07-19 NOTE — PROGRESS NOTE ADULT - ASSESSMENT
Patient is a 67M with a PMHx of HTN, HLD, DM, depression, and CKD, who presented to the ED with abdominal pain. GI was consulted for abdominal pain.     #Abdominal pain  #Nausea/Vomiting  #Cholelithiasis  #Fatty liver  #Abnormal CT  #Pancreatic lesion  Patient presented with worsening abdominal pain x 4 days associated with GERD symptoms. No prior EGD. Imaging notable for cholelithiasis but no indication for acute cholecystitis. He was evaluated by surgery, recommended outpatient elective lap kristina. CT also notable for nonspecific 9mm focus of nodular enhancement within distal PT region as well as 1.4 x 1.1cm peripancreatic LN, awaiting MRI abd w/wo contrast for better visualization. Tumor markers neg, CA 19-9 is 14, AFP 2.2, CEA 1.3.    	- Tentative EGD/EUS this Friday, 7/21, with Dr. Carlos  	- Follow up MRCP   	- Thursday: CLD, NPO after midnight  	- Friday AM labs: CBC, CMP, PT/INR  	- Hold chemical DVT ppx/AC x 24 hrs prior to procedure  	- Covid swab within 72 hrs of procedure  	- Diet as tolerated  	- Continue IV/PO Protonix 40mg BID  	- Continue PO carafate 1g four times daily, 30 minutes before meals and snacks  	- Daily miralax    This note and its recommendations herein are preliminary until such time as cosigned by an attending.

## 2023-07-19 NOTE — PROGRESS NOTE ADULT - PROBLEM SELECTOR PLAN 1
Pt presents with abdominla pain with nausea for the past 3 days, exacerbated by food.  CT A/P - Gallstones. Mild gallbladder wall thickening noted. Correlate for acute cholecystitis.  RUQ ultrasound - Gallstones without sonographic evidence of acute cholecystitis. Fatty infiltration of the liver. negative Doe's sign  - C/w Pain control - severe pain morphine q6 hrs  - C/w PPI  - Surgery Following - no acute intervention  - f/u MRCP today  - tentative EGD this Friday

## 2023-07-19 NOTE — PROGRESS NOTE ADULT - SUBJECTIVE AND OBJECTIVE BOX
HPI:  This is a 66 y/o M from home, with PMHx HTN, HLD, DM, and CKD who presents with abdominal pain. Pt states pain started 3 days ago and is located throughout the entire abdomen. Pt states pain sometimes radiates to the right shoulder area and he also feels a burning sensation in his epigastric area. Pt states pain is worse with eating and has been constant for the past 3 days. Pt endorses some nausea and fevers at home. Pt also states he has been drooling more at night time. Pt adds that he recently stopped taking carbidopa and levodopa after several years of taking it, due to his neurologist, Dr. Mcconnell recommending he stop. Last colonoscopy in 2008 was normal. No prior endoscopies. Pt denies any new foods, headaches, dizziness, CP, SOB, diarrhea, constipation, numbness or tingling.  (17 Jul 2023 23:31)      Patient is a 67y old  Male who presents with a chief complaint of intractable abdominal pain (19 Jul 2023 14:14)      INTERVAL HPI/OVERNIGHT EVENTS:  T(C): 36.3 (07-19-23 @ 13:20), Max: 37.1 (07-18-23 @ 20:18)  HR: 62 (07-19-23 @ 13:20) (56 - 62)  BP: 134/70 (07-19-23 @ 13:20) (134/70 - 157/83)  RR: 18 (07-19-23 @ 13:20) (16 - 18)  SpO2: 96% (07-19-23 @ 13:20) (95% - 98%)  Wt(kg): --  I&O's Summary      REVIEW OF SYSTEMS: denies fever, chills, SOB, palpitations, chest pain, abdominal pain, nausea, vomitting, diarrhea, constipation, dizziness    MEDICATIONS  (STANDING):  amLODIPine   Tablet 5 milliGRAM(s) Oral daily  aspirin  chewable 81 milliGRAM(s) Oral daily  fenofibrate Tablet 48 milliGRAM(s) Oral daily  gabapentin 100 milliGRAM(s) Oral three times a day  heparin   Injectable 5000 Unit(s) SubCutaneous every 12 hours  insulin lispro (ADMELOG) corrective regimen sliding scale   SubCutaneous three times a day before meals  insulin lispro (ADMELOG) corrective regimen sliding scale   SubCutaneous at bedtime  labetalol 100 milliGRAM(s) Oral every 8 hours  lisinopril 20 milliGRAM(s) Oral daily  pantoprazole    Tablet 40 milliGRAM(s) Oral two times a day  polyethylene glycol 3350 17 Gram(s) Oral daily  saline laxative (FLEET) Rectal Enema 1 Enema Rectal once  simvastatin 20 milliGRAM(s) Oral at bedtime  sodium chloride 0.9%. 1000 milliLiter(s) (100 mL/Hr) IV Continuous <Continuous>  sucralfate 1 Gram(s) Oral four times a day    MEDICATIONS  (PRN):  acetaminophen     Tablet .. 650 milliGRAM(s) Oral every 6 hours PRN Temp greater or equal to 38C (100.4F), Mild Pain (1 - 3), Moderate Pain (4 - 6)  aluminum hydroxide/magnesium hydroxide/simethicone Suspension 30 milliLiter(s) Oral every 4 hours PRN Dyspepsia  ondansetron Injectable 4 milliGRAM(s) IV Push every 8 hours PRN Nausea and/or Vomiting      PHYSICAL EXAM:  GENERAL: NAD, well-groomed, well-developed  HEAD:  Atraumatic, Normocephalic  EYES: EOMI, PERRLA, conjunctiva and sclera clear  ENMT: No tonsillar erythema, exudates, or enlargement; Moist mucous membranes, Good dentition, No lesions  NECK: Supple, No JVD, Normal thyroid  NERVOUS SYSTEM:  Alert & Oriented X3, Good concentration; Motor Strength 5/5 B/L upper and lower extremities; DTRs 2+ intact and symmetric  CHEST/LUNG: Clear to percussion bilaterally; No rales, rhonchi, wheezing, or rubs  HEART: Regular rate and rhythm; No murmurs, rubs, or gallops  ABDOMEN: Soft, Nontender, Nondistended; Bowel sounds present  EXTREMITIES:  2+ Peripheral Pulses, No clubbing, cyanosis, or edema  LYMPH: No lymphadenopathy noted  SKIN: No rashes or lesions  LABS:                        14.2   6.26  )-----------( 191      ( 19 Jul 2023 05:59 )             44.5     07-19    137  |  107  |  20<H>  ----------------------------<  131<H>  3.8   |  22  |  1.67<H>    Ca    9.1      19 Jul 2023 05:59  Phos  3.3     07-18  Mg     2.4     07-18    TPro  7.0  /  Alb  3.7  /  TBili  0.7  /  DBili  x   /  AST  11  /  ALT  23  /  AlkPhos  52  07-18      Urinalysis Basic - ( 19 Jul 2023 05:59 )    Color: x / Appearance: x / SG: x / pH: x  Gluc: 131 mg/dL / Ketone: x  / Bili: x / Urobili: x   Blood: x / Protein: x / Nitrite: x   Leuk Esterase: x / RBC: x / WBC x   Sq Epi: x / Non Sq Epi: x / Bacteria: x      CAPILLARY BLOOD GLUCOSE      POCT Blood Glucose.: 217 mg/dL (19 Jul 2023 11:20)  POCT Blood Glucose.: 121 mg/dL (19 Jul 2023 08:06)  POCT Blood Glucose.: 232 mg/dL (18 Jul 2023 21:15)  POCT Blood Glucose.: 224 mg/dL (18 Jul 2023 16:05)        Urinalysis Basic - ( 19 Jul 2023 05:59 )    Color: x / Appearance: x / SG: x / pH: x  Gluc: 131 mg/dL / Ketone: x  / Bili: x / Urobili: x   Blood: x / Protein: x / Nitrite: x   Leuk Esterase: x / RBC: x / WBC x   Sq Epi: x / Non Sq Epi: x / Bacteria: x

## 2023-07-19 NOTE — PROGRESS NOTE ADULT - SUBJECTIVE AND OBJECTIVE BOX
Patient is a 67y old  Male who presents with a chief complaint of intractable abdominal pain (19 Jul 2023 10:28)      INTERVAL HPI/OVERNIGHT EVENTS: no acute events overnight         REVIEW OF SYSTEMS:  CONSTITUTIONAL: No fever, chills  ENMT:  No difficulty hearing, no change in vision  NECK: No pain or stiffness  RESPIRATORY: No cough, SOB  CARDIOVASCULAR: No chest pain, palpitations  GASTROINTESTINAL: c/o abd cramps.  No nausea, vomiting, or diarrhea  GENITOURINARY: No dysuria  NEUROLOGICAL: No HA  SKIN: No itching, burning, rashes, or lesions   LYMPH NODES: No enlarged glands  ENDOCRINE: No heat or cold intolerance; No hair loss  MUSCULOSKELETAL: No joint pain or swelling; No muscle, back, or extremity pain  PSYCHIATRIC: No depression, anxiety  HEME/LYMPH: No easy bruising, or bleeding gums    T(C): 36.3 (07-19-23 @ 13:20), Max: 37.1 (07-18-23 @ 20:18)  HR: 62 (07-19-23 @ 13:20) (56 - 62)  BP: 134/70 (07-19-23 @ 13:20) (134/70 - 157/83)  RR: 18 (07-19-23 @ 13:20) (16 - 18)  SpO2: 96% (07-19-23 @ 13:20) (95% - 98%)  Wt(kg): --Vital Signs Last 24 Hrs  T(C): 36.3 (19 Jul 2023 13:20), Max: 37.1 (18 Jul 2023 20:18)  T(F): 97.3 (19 Jul 2023 13:20), Max: 98.7 (18 Jul 2023 20:18)  HR: 62 (19 Jul 2023 13:20) (56 - 62)  BP: 134/70 (19 Jul 2023 13:20) (134/70 - 157/83)  BP(mean): --  RR: 18 (19 Jul 2023 13:20) (16 - 18)  SpO2: 96% (19 Jul 2023 13:20) (95% - 98%)    Parameters below as of 19 Jul 2023 13:20  Patient On (Oxygen Delivery Method): room air      MEDICATIONS  (STANDING):  amLODIPine   Tablet 5 milliGRAM(s) Oral daily  aspirin  chewable 81 milliGRAM(s) Oral daily  fenofibrate Tablet 48 milliGRAM(s) Oral daily  gabapentin 100 milliGRAM(s) Oral three times a day  heparin   Injectable 5000 Unit(s) SubCutaneous every 12 hours  insulin lispro (ADMELOG) corrective regimen sliding scale   SubCutaneous three times a day before meals  insulin lispro (ADMELOG) corrective regimen sliding scale   SubCutaneous at bedtime  labetalol 100 milliGRAM(s) Oral every 8 hours  lisinopril 20 milliGRAM(s) Oral daily  pantoprazole    Tablet 40 milliGRAM(s) Oral two times a day  polyethylene glycol 3350 17 Gram(s) Oral daily  saline laxative (FLEET) Rectal Enema 1 Enema Rectal once  simvastatin 20 milliGRAM(s) Oral at bedtime  sodium chloride 0.9%. 1000 milliLiter(s) (100 mL/Hr) IV Continuous <Continuous>  sucralfate 1 Gram(s) Oral four times a day    MEDICATIONS  (PRN):  acetaminophen     Tablet .. 650 milliGRAM(s) Oral every 6 hours PRN Temp greater or equal to 38C (100.4F), Mild Pain (1 - 3), Moderate Pain (4 - 6)  aluminum hydroxide/magnesium hydroxide/simethicone Suspension 30 milliLiter(s) Oral every 4 hours PRN Dyspepsia  ondansetron Injectable 4 milliGRAM(s) IV Push every 8 hours PRN Nausea and/or Vomiting    PHYSICAL EXAM:  GENERAL: NAD  EYES: clear conjunctiva; EOMI  ENMT: Moist mucous membranes  NECK: Supple, No JVD, Normal thyroid  CHEST/LUNG: Clear to auscultation bilaterally; No rales, rhonchi, wheezing, or rubs  HEART: S1, S2, Regular rate and rhythm  ABDOMEN: Soft, mild tender, Nondistended; Bowel sounds present  NEURO: Alert & Oriented X3  EXTREMITIES: No LE edema, no calf tenderness  LYMPH: No lymphadenopathy noted  SKIN: No rashes or lesions    Consultant(s) Notes Reviewed:  [x ] YES  [ ] NO  Care Discussed with Consultants/Other Providers [ x] YES  [ ] NO    LABS:                        14.2   6.26  )-----------( 191      ( 19 Jul 2023 05:59 )             44.5     07-19    137  |  107  |  20<H>  ----------------------------<  131<H>  3.8   |  22  |  1.67<H>    Ca    9.1      19 Jul 2023 05:59  Phos  3.3     07-18  Mg     2.4     07-18    TPro  7.0  /  Alb  3.7  /  TBili  0.7  /  DBili  x   /  AST  11  /  ALT  23  /  AlkPhos  52  07-18      CAPILLARY BLOOD GLUCOSE      POCT Blood Glucose.: 217 mg/dL (19 Jul 2023 11:20)  POCT Blood Glucose.: 121 mg/dL (19 Jul 2023 08:06)  POCT Blood Glucose.: 232 mg/dL (18 Jul 2023 21:15)  POCT Blood Glucose.: 224 mg/dL (18 Jul 2023 16:05)        Urinalysis Basic - ( 19 Jul 2023 05:59 )    Color: x / Appearance: x / SG: x / pH: x  Gluc: 131 mg/dL / Ketone: x  / Bili: x / Urobili: x   Blood: x / Protein: x / Nitrite: x   Leuk Esterase: x / RBC: x / WBC x   Sq Epi: x / Non Sq Epi: x / Bacteria: x        RADIOLOGY & ADDITIONAL TESTS:    Imaging Personally Reviewed:  [x ] YES  [ ] NO  < from: US Abdomen Upper Quadrant Right (07.17.23 @ 18:10) >    ACC: [id number] EXAM:  US ABDOMEN RT UPR QUADRANT   ORDERED BY: MARILEE PRINCE     PROCEDURE DATE:  07/17/2023          INTERPRETATION:  CLINICAL INFORMATION: Mid abdominal pain    COMPARISON: CT abdomen pelvis performed on the same day.    TECHNIQUE: Sonography of the right upper quadrant.    FINDINGS:  Liver: Increased echogenicity.  Bile ducts: Normal caliber. Common bile duct measures 5 mm.  Gallbladder: Gallstones without wall thickening or pericholecystic fluid.   Sonographic Doe's sign is negative.  Pancreas: Visualized portions are within normal limits.  Right kidney: 10.3 cm. No hydronephrosis.  Ascites: None.  IVC: Visualized portions are within normal limits.    IMPRESSION:  Gallstones without sonographic evidence of acute cholecystitis.    Fatty infiltration of the liver.        --- End of Report ---            MARY ALCAZAR MD; Attending Radiologist  This document has been electronically signed. Jul 17 2023  6:14PM    < end of copied text >

## 2023-07-19 NOTE — PROGRESS NOTE ADULT - ASSESSMENT
68 y/o M from home, with PMHx HTN, HLD, DM, and CKD who presents with abdominal pain. Admitted for intractable abdominal pain. Pending MRCP. GI following

## 2023-07-20 DIAGNOSIS — Z02.9 ENCOUNTER FOR ADMINISTRATIVE EXAMINATIONS, UNSPECIFIED: ICD-10-CM

## 2023-07-20 LAB
ALBUMIN SERPL ELPH-MCNC: 3.6 G/DL — SIGNIFICANT CHANGE UP (ref 3.5–5)
ALP SERPL-CCNC: 51 U/L — SIGNIFICANT CHANGE UP (ref 40–120)
ALT FLD-CCNC: 24 U/L DA — SIGNIFICANT CHANGE UP (ref 10–60)
ANION GAP SERPL CALC-SCNC: 3 MMOL/L — LOW (ref 5–17)
AST SERPL-CCNC: 16 U/L — SIGNIFICANT CHANGE UP (ref 10–40)
BILIRUB SERPL-MCNC: 0.6 MG/DL — SIGNIFICANT CHANGE UP (ref 0.2–1.2)
BUN SERPL-MCNC: 19 MG/DL — HIGH (ref 7–18)
CALCIUM SERPL-MCNC: 8.9 MG/DL — SIGNIFICANT CHANGE UP (ref 8.4–10.5)
CHLORIDE SERPL-SCNC: 112 MMOL/L — HIGH (ref 96–108)
CO2 SERPL-SCNC: 24 MMOL/L — SIGNIFICANT CHANGE UP (ref 22–31)
CREAT SERPL-MCNC: 1.59 MG/DL — HIGH (ref 0.5–1.3)
EGFR: 47 ML/MIN/1.73M2 — LOW
GLUCOSE BLDC GLUCOMTR-MCNC: 119 MG/DL — HIGH (ref 70–99)
GLUCOSE BLDC GLUCOMTR-MCNC: 165 MG/DL — HIGH (ref 70–99)
GLUCOSE BLDC GLUCOMTR-MCNC: 223 MG/DL — HIGH (ref 70–99)
GLUCOSE BLDC GLUCOMTR-MCNC: 226 MG/DL — HIGH (ref 70–99)
GLUCOSE SERPL-MCNC: 129 MG/DL — HIGH (ref 70–99)
HCT VFR BLD CALC: 43.4 % — SIGNIFICANT CHANGE UP (ref 39–50)
HGB BLD-MCNC: 13.9 G/DL — SIGNIFICANT CHANGE UP (ref 13–17)
MCHC RBC-ENTMCNC: 25.7 PG — LOW (ref 27–34)
MCHC RBC-ENTMCNC: 32 GM/DL — SIGNIFICANT CHANGE UP (ref 32–36)
MCV RBC AUTO: 80.4 FL — SIGNIFICANT CHANGE UP (ref 80–100)
NRBC # BLD: 0 /100 WBCS — SIGNIFICANT CHANGE UP (ref 0–0)
PLATELET # BLD AUTO: 181 K/UL — SIGNIFICANT CHANGE UP (ref 150–400)
POTASSIUM SERPL-MCNC: 3.7 MMOL/L — SIGNIFICANT CHANGE UP (ref 3.5–5.3)
POTASSIUM SERPL-SCNC: 3.7 MMOL/L — SIGNIFICANT CHANGE UP (ref 3.5–5.3)
PROT SERPL-MCNC: 6.9 G/DL — SIGNIFICANT CHANGE UP (ref 6–8.3)
RBC # BLD: 5.4 M/UL — SIGNIFICANT CHANGE UP (ref 4.2–5.8)
RBC # FLD: 14.3 % — SIGNIFICANT CHANGE UP (ref 10.3–14.5)
SODIUM SERPL-SCNC: 139 MMOL/L — SIGNIFICANT CHANGE UP (ref 135–145)
WBC # BLD: 5.5 K/UL — SIGNIFICANT CHANGE UP (ref 3.8–10.5)
WBC # FLD AUTO: 5.5 K/UL — SIGNIFICANT CHANGE UP (ref 3.8–10.5)

## 2023-07-20 PROCEDURE — 74183 MRI ABD W/O CNTR FLWD CNTR: CPT | Mod: 26

## 2023-07-20 PROCEDURE — 99232 SBSQ HOSP IP/OBS MODERATE 35: CPT

## 2023-07-20 RX ADMIN — GABAPENTIN 100 MILLIGRAM(S): 400 CAPSULE ORAL at 21:42

## 2023-07-20 RX ADMIN — GABAPENTIN 100 MILLIGRAM(S): 400 CAPSULE ORAL at 06:23

## 2023-07-20 RX ADMIN — Medication 1 GRAM(S): at 23:36

## 2023-07-20 RX ADMIN — Medication 1 GRAM(S): at 11:47

## 2023-07-20 RX ADMIN — Medication 100 MILLIGRAM(S): at 21:40

## 2023-07-20 RX ADMIN — AMLODIPINE BESYLATE 5 MILLIGRAM(S): 2.5 TABLET ORAL at 06:23

## 2023-07-20 RX ADMIN — PANTOPRAZOLE SODIUM 40 MILLIGRAM(S): 20 TABLET, DELAYED RELEASE ORAL at 18:31

## 2023-07-20 RX ADMIN — LISINOPRIL 20 MILLIGRAM(S): 2.5 TABLET ORAL at 06:23

## 2023-07-20 RX ADMIN — SIMVASTATIN 20 MILLIGRAM(S): 20 TABLET, FILM COATED ORAL at 21:40

## 2023-07-20 RX ADMIN — Medication 1 GRAM(S): at 18:27

## 2023-07-20 RX ADMIN — Medication 1 GRAM(S): at 00:47

## 2023-07-20 RX ADMIN — GABAPENTIN 100 MILLIGRAM(S): 400 CAPSULE ORAL at 15:03

## 2023-07-20 RX ADMIN — Medication 1 GRAM(S): at 06:22

## 2023-07-20 RX ADMIN — Medication 2: at 11:48

## 2023-07-20 RX ADMIN — PANTOPRAZOLE SODIUM 40 MILLIGRAM(S): 20 TABLET, DELAYED RELEASE ORAL at 06:23

## 2023-07-20 RX ADMIN — HEPARIN SODIUM 5000 UNIT(S): 5000 INJECTION INTRAVENOUS; SUBCUTANEOUS at 18:31

## 2023-07-20 RX ADMIN — Medication 81 MILLIGRAM(S): at 11:47

## 2023-07-20 RX ADMIN — HEPARIN SODIUM 5000 UNIT(S): 5000 INJECTION INTRAVENOUS; SUBCUTANEOUS at 06:22

## 2023-07-20 RX ADMIN — Medication 100 MILLIGRAM(S): at 06:23

## 2023-07-20 RX ADMIN — Medication 48 MILLIGRAM(S): at 11:47

## 2023-07-20 RX ADMIN — Medication 2: at 16:51

## 2023-07-20 NOTE — PROGRESS NOTE ADULT - ASSESSMENT
68 y/o M from home, with PMHx HTN, HLD, DM, and CKD who presents with abdominal pain. Admitted for intractable abdominal pain. Pending MRCP. PLan for EUS/EGD on Friday 7/21. GI following

## 2023-07-20 NOTE — CONSULT NOTE ADULT - ASSESSMENT
This is a 68 y/o M from home, with PMHx HTN, HLD, DM, and CKD who presents with abdominal pain.   Found to have uncont dm- Pt admits to taking all oral dm meds as prescribed- noncompliant with diet- prev a1c 7.8?

## 2023-07-20 NOTE — PROGRESS NOTE ADULT - NS ATTEND AMEND GEN_ALL_CORE FT
Total time spent to complete patient's bedside assessment, physical examination, review medical chart including labs & imaging, discuss medical plan of care with housestaff was more than 25 minutes
MRI reviewed, question of splenule vs other lesion i.e. PDAC/pNET, given needs EGD to evaluate upper GI complaints and chronic GERD, will plan for EUS same time to evaluate lesion nette in light of enhancement on preceding CT, possible FNB. NPO after midnight.     Total time spent to complete patient's bedside assessment, physical examination, review medical chart including labs & imaging, discuss medical plan of care with housestaff was more than 25 minutes

## 2023-07-20 NOTE — PROGRESS NOTE ADULT - PROBLEM SELECTOR PLAN 1
Pt presents with abdominal pain with nausea for the past 3 days, exacerbated by food.  CT A/P - Gallstones. Mild gallbladder wall thickening noted. Correlate for acute cholecystitis.  RUQ ultrasound - Gallstones without sonographic evidence of acute cholecystitis  - C/w Pain control - severe pain morphine q6 hrs  - C/w PPI  - Surgery Following - no acute intervention  - f/u MRCP today  - NPO after MN for EGD/ EUS on am   - clears today   - GI Dr Carlos

## 2023-07-20 NOTE — PROGRESS NOTE ADULT - SUBJECTIVE AND OBJECTIVE BOX
HPI:  This is a 68 y/o M from home, with PMHx HTN, HLD, DM, and CKD who presents with abdominal pain. Pt states pain started 3 days ago and is located throughout the entire abdomen. Pt states pain sometimes radiates to the right shoulder area and he also feels a burning sensation in his epigastric area. Pt states pain is worse with eating and has been constant for the past 3 days. Pt endorses some nausea and fevers at home. Pt also states he has been drooling more at night time. Pt adds that he recently stopped taking carbidopa and levodopa after several years of taking it, due to his neurologist, Dr. Mcconnell recommending he stop. Last colonoscopy in 2008 was normal. No prior endoscopies. Pt denies any new foods, headaches, dizziness, CP, SOB, diarrhea, constipation, numbness or tingling.  (17 Jul 2023 23:31)      Patient is a 67y old  Male who presents with a chief complaint of intractable abdominal pain (20 Jul 2023 10:55)      INTERVAL HPI/OVERNIGHT EVENTS:  T(C): 36.8 (07-20-23 @ 04:10), Max: 36.8 (07-20-23 @ 04:10)  HR: 56 (07-20-23 @ 04:10) (56 - 66)  BP: 148/73 (07-20-23 @ 04:10) (129/64 - 148/73)  RR: 18 (07-20-23 @ 04:10) (18 - 18)  SpO2: 96% (07-20-23 @ 04:10) (96% - 97%)  Wt(kg): --  I&O's Summary      REVIEW OF SYSTEMS: denies fever, chills, SOB, palpitations, chest pain, abdominal pain, nausea, vomitting, diarrhea, constipation, dizziness    MEDICATIONS  (STANDING):  amLODIPine   Tablet 5 milliGRAM(s) Oral daily  aspirin  chewable 81 milliGRAM(s) Oral daily  fenofibrate Tablet 48 milliGRAM(s) Oral daily  gabapentin 100 milliGRAM(s) Oral three times a day  heparin   Injectable 5000 Unit(s) SubCutaneous every 12 hours  insulin lispro (ADMELOG) corrective regimen sliding scale   SubCutaneous three times a day before meals  insulin lispro (ADMELOG) corrective regimen sliding scale   SubCutaneous at bedtime  labetalol 100 milliGRAM(s) Oral every 8 hours  lisinopril 20 milliGRAM(s) Oral daily  pantoprazole    Tablet 40 milliGRAM(s) Oral two times a day  polyethylene glycol 3350 17 Gram(s) Oral daily  saline laxative (FLEET) Rectal Enema 1 Enema Rectal once  simvastatin 20 milliGRAM(s) Oral at bedtime  sodium chloride 0.9%. 1000 milliLiter(s) (100 mL/Hr) IV Continuous <Continuous>  sucralfate 1 Gram(s) Oral four times a day    MEDICATIONS  (PRN):  acetaminophen     Tablet .. 650 milliGRAM(s) Oral every 6 hours PRN Temp greater or equal to 38C (100.4F), Mild Pain (1 - 3), Moderate Pain (4 - 6)  aluminum hydroxide/magnesium hydroxide/simethicone Suspension 30 milliLiter(s) Oral every 4 hours PRN Dyspepsia  ondansetron Injectable 4 milliGRAM(s) IV Push every 8 hours PRN Nausea and/or Vomiting      PHYSICAL EXAM:  GENERAL: NAD, well-groomed, well-developed  HEAD:  Atraumatic, Normocephalic  EYES: EOMI, PERRLA, conjunctiva and sclera clear  ENMT: No tonsillar erythema, exudates, or enlargement; Moist mucous membranes, Good dentition, No lesions  NECK: Supple, No JVD, Normal thyroid  NERVOUS SYSTEM:  Alert & Oriented X3, Good concentration; Motor Strength 5/5 B/L upper and lower extremities; DTRs 2+ intact and symmetric  CHEST/LUNG: Clear to percussion bilaterally; No rales, rhonchi, wheezing, or rubs  HEART: Regular rate and rhythm; No murmurs, rubs, or gallops  ABDOMEN: Soft, Nontender, Nondistended; Bowel sounds present  EXTREMITIES:  2+ Peripheral Pulses, No clubbing, cyanosis, or edema  LYMPH: No lymphadenopathy noted  SKIN: No rashes or lesions  LABS:                        13.9   5.50  )-----------( 181      ( 20 Jul 2023 05:48 )             43.4     07-20    139  |  112<H>  |  19<H>  ----------------------------<  129<H>  3.7   |  24  |  1.59<H>    Ca    8.9      20 Jul 2023 05:48    TPro  6.9  /  Alb  3.6  /  TBili  0.6  /  DBili  x   /  AST  16  /  ALT  24  /  AlkPhos  51  07-20      Urinalysis Basic - ( 20 Jul 2023 05:48 )    Color: x / Appearance: x / SG: x / pH: x  Gluc: 129 mg/dL / Ketone: x  / Bili: x / Urobili: x   Blood: x / Protein: x / Nitrite: x   Leuk Esterase: x / RBC: x / WBC x   Sq Epi: x / Non Sq Epi: x / Bacteria: x      CAPILLARY BLOOD GLUCOSE      POCT Blood Glucose.: 119 mg/dL (20 Jul 2023 08:03)  POCT Blood Glucose.: 194 mg/dL (19 Jul 2023 21:28)  POCT Blood Glucose.: 127 mg/dL (19 Jul 2023 16:21)  POCT Blood Glucose.: 217 mg/dL (19 Jul 2023 11:20)        Urinalysis Basic - ( 20 Jul 2023 05:48 )    Color: x / Appearance: x / SG: x / pH: x  Gluc: 129 mg/dL / Ketone: x  / Bili: x / Urobili: x   Blood: x / Protein: x / Nitrite: x   Leuk Esterase: x / RBC: x / WBC x   Sq Epi: x / Non Sq Epi: x / Bacteria: x

## 2023-07-20 NOTE — PROGRESS NOTE ADULT - ASSESSMENT
Patient is a 67M with a PMHx of HTN, HLD, DM, depression, and CKD, who presented to the ED with abdominal pain. GI was consulted for abdominal pain.     #Abdominal pain  #Nausea/Vomiting  #Cholelithiasis  #Fatty liver  #Abnormal CT  #Pancreatic lesion  Patient presented with worsening abdominal pain x 4 days associated with GERD symptoms. No prior EGD. Imaging notable for cholelithiasis but no indication for acute cholecystitis. He was evaluated by surgery, recommended outpatient elective lap kristina. CT also notable for nonspecific 9mm focus of nodular enhancement within distal PT region as well as 1.4 x 1.1cm peripancreatic LN, awaiting MRI abd w/wo contrast for better visualization. Tumor markers neg, CA 19-9 is 14, AFP 2.2, CEA 1.3.    	- MRCP pending  	- Tentative EGD/EUS tomorrow, 7/21, with Dr. Carlos  	- CLD now, NPO after midnight  	- Friday AM labs: CBC, CMP, PT/INR  	- Hold chemical DVT ppx/AC x 24 hrs prior to procedure  	- Covid swab within 72 hrs of procedure  	- Continue IV/PO protonix 40mg BID  	- Continue PO carafate 1g four times daily, 30 min before meals and snacks  	- Daily miralax    This note and its recommendations herein are preliminary until such time as cosigned by an attending.

## 2023-07-20 NOTE — CONSULT NOTE ADULT - SUBJECTIVE AND OBJECTIVE BOX
Patient is a 67y old  Male who presents with a chief complaint of intractable abdominal pain (20 Jul 2023 11:18)      HPI:  This is a 66 y/o M from home, with PMHx HTN, HLD, DM, and CKD who presents with abdominal pain. Pt states pain started 3 days ago and is located throughout the entire abdomen. Pt states pain sometimes radiates to the right shoulder area and he also feels a burning sensation in his epigastric area. Pt states pain is worse with eating and has been constant for the past 3 days. Pt endorses some nausea and fevers at home. Pt also states he has been drooling more at night time. Pt adds that he recently stopped taking carbidopa and levodopa after several years of taking it, due to his neurologist, Dr. Mcconnell recommending he stop. Last colonoscopy in 2008 was normal. No prior endoscopies. Pt denies any new foods, headaches, dizziness, CP, SOB, diarrhea, constipation, numbness or tingling.  (17 Jul 2023 23:31)  Found to have uncont dm- Pt admits to taking all oral dm meds as prescribed- noncompliant with diet- prev a1c 7.8?     PAST MEDICAL & SURGICAL HISTORY:  HTN (hypertension)      DM (diabetes mellitus)      Back pain, chronic      HLD (hyperlipidemia)      CKD (chronic kidney disease)             MEDICATIONS  (STANDING):  amLODIPine   Tablet 5 milliGRAM(s) Oral daily  aspirin  chewable 81 milliGRAM(s) Oral daily  fenofibrate Tablet 48 milliGRAM(s) Oral daily  gabapentin 100 milliGRAM(s) Oral three times a day  heparin   Injectable 5000 Unit(s) SubCutaneous every 12 hours  insulin lispro (ADMELOG) corrective regimen sliding scale   SubCutaneous three times a day before meals  insulin lispro (ADMELOG) corrective regimen sliding scale   SubCutaneous at bedtime  labetalol 100 milliGRAM(s) Oral every 8 hours  lisinopril 20 milliGRAM(s) Oral daily  pantoprazole    Tablet 40 milliGRAM(s) Oral two times a day  polyethylene glycol 3350 17 Gram(s) Oral daily  saline laxative (FLEET) Rectal Enema 1 Enema Rectal once  simvastatin 20 milliGRAM(s) Oral at bedtime  sodium chloride 0.9%. 1000 milliLiter(s) (100 mL/Hr) IV Continuous <Continuous>  sucralfate 1 Gram(s) Oral four times a day    MEDICATIONS  (PRN):  acetaminophen     Tablet .. 650 milliGRAM(s) Oral every 6 hours PRN Temp greater or equal to 38C (100.4F), Mild Pain (1 - 3), Moderate Pain (4 - 6)  aluminum hydroxide/magnesium hydroxide/simethicone Suspension 30 milliLiter(s) Oral every 4 hours PRN Dyspepsia  ondansetron Injectable 4 milliGRAM(s) IV Push every 8 hours PRN Nausea and/or Vomiting      FAMILY HISTORY:  Family history of diabetes mellitus (DM) (Mother, Sibling)        SOCIAL HISTORY:      REVIEW OF SYSTEMS:  CONSTITUTIONAL: No fever, weight loss, or fatigue  EYES: No eye pain, visual disturbances, or discharge  ENT:  No difficulty hearing, tinnitus, vertigo; No sinus or throat pain  NECK: No pain or stiffness  RESPIRATORY: No cough, wheezing, chills or hemoptysis; No Shortness of Breath  CARDIOVASCULAR: No chest pain, palpitations, passing out, dizziness, or leg swelling  GASTROINTESTINAL: No abdominal or epigastric pain. No nausea, vomiting, or hematemesis; No diarrhea or constipation. No melena or hematochezia.  GENITOURINARY: No dysuria, frequency, hematuria, or incontinence  NEUROLOGICAL: No headaches, memory loss, loss of strength, numbness, or tremors  SKIN: No itching, burning, rashes, or lesions   LYMPH Nodes: No enlarged glands  ENDOCRINE: No heat or cold intolerance; No hair loss  MUSCULOSKELETAL: No joint pain or swelling; No muscle, back, or extremity pain  PSYCHIATRIC: No depression, anxiety, mood swings, or difficulty sleeping  HEME/LYMPH: No easy bruising, or bleeding gums  ALLERGY AND IMMUNOLOGIC: No hives or eczema	        Vital Signs Last 24 Hrs  T(C): 37.1 (20 Jul 2023 12:29), Max: 37.1 (20 Jul 2023 12:29)  T(F): 98.8 (20 Jul 2023 12:29), Max: 98.8 (20 Jul 2023 12:29)  HR: 55 (20 Jul 2023 14:50) (55 - 66)  BP: 118/66 (20 Jul 2023 14:50) (118/66 - 148/73)  BP(mean): --  RR: 16 (20 Jul 2023 12:29) (16 - 18)  SpO2: 95% (20 Jul 2023 12:29) (95% - 97%)    Parameters below as of 20 Jul 2023 12:29  Patient On (Oxygen Delivery Method): room air          Constitutional:    HEENT: nad    Neck:  No JVD, bruits or thyromegaly    Respiratory:  Clear without rales or rhonchi    Cardiovascular:  RR without murmur, rub or gallop.    Gastrointestinal: Soft without hepatosplenomegaly.    Extremities: without cyanosis, clubbing or edema.    Neurological:  Oriented   x    3  . No gross sensory or motor defects.        LABS:                        13.9   5.50  )-----------( 181      ( 20 Jul 2023 05:48 )             43.4     07-20    139  |  112<H>  |  19<H>  ----------------------------<  129<H>  3.7   |  24  |  1.59<H>    Ca    8.9      20 Jul 2023 05:48    TPro  6.9  /  Alb  3.6  /  TBili  0.6  /  DBili  x   /  AST  16  /  ALT  24  /  AlkPhos  51  07-20          Urinalysis Basic - ( 20 Jul 2023 05:48 )    Color: x / Appearance: x / SG: x / pH: x  Gluc: 129 mg/dL / Ketone: x  / Bili: x / Urobili: x   Blood: x / Protein: x / Nitrite: x   Leuk Esterase: x / RBC: x / WBC x   Sq Epi: x / Non Sq Epi: x / Bacteria: x      CAPILLARY BLOOD GLUCOSE      POCT Blood Glucose.: 223 mg/dL (20 Jul 2023 16:29)  POCT Blood Glucose.: 226 mg/dL (20 Jul 2023 11:28)  POCT Blood Glucose.: 119 mg/dL (20 Jul 2023 08:03)  POCT Blood Glucose.: 194 mg/dL (19 Jul 2023 21:28)      RADIOLOGY & ADDITIONAL STUDIES:

## 2023-07-20 NOTE — PROGRESS NOTE ADULT - SUBJECTIVE AND OBJECTIVE BOX
INTERVAL HPI/OVERNIGHT EVENTS:    Pt seen and examined at bedside. Admits to epigastric pain, denies nausea or vomiting; no fever, chills, SOB or CP. Tolerating reg diet well.     Vital Signs Last 24 Hrs  T(C): 36.8 (20 Jul 2023 04:10), Max: 36.8 (20 Jul 2023 04:10)  T(F): 98.3 (20 Jul 2023 04:10), Max: 98.3 (20 Jul 2023 04:10)  HR: 56 (20 Jul 2023 04:10) (56 - 66)  BP: 148/73 (20 Jul 2023 04:10) (129/64 - 148/73)  BP(mean): --  RR: 18 (20 Jul 2023 04:10) (18 - 18)  SpO2: 96% (20 Jul 2023 04:10) (96% - 97%)    Parameters below as of 20 Jul 2023 04:10  Patient On (Oxygen Delivery Method): room air      I&O's Detail    aluminum hydroxide/magnesium hydroxide/simethicone Suspension 30 milliLiter(s) Oral every 4 hours PRN  pantoprazole    Tablet 40 milliGRAM(s) Oral two times a day  polyethylene glycol 3350 17 Gram(s) Oral daily  saline laxative (FLEET) Rectal Enema 1 Enema Rectal once  sucralfate 1 Gram(s) Oral four times a day      Physical Exam  General: AAOx3, No acute distress  Skin: No jaundice, no icterus  Abdomen: soft, nondistended, mild epigastric TTP, no rebound tenderness, no guarding, no palpable masses  Extremities: non edematous, no calf pain bilaterally      Labs:                        13.9   5.50  )-----------( 181      ( 20 Jul 2023 05:48 )             43.4     07-20    139  |  112<H>  |  19<H>  ----------------------------<  129<H>  3.7   |  24  |  1.59<H>    Ca    8.9      20 Jul 2023 05:48    TPro  6.9  /  Alb  3.6  /  TBili  0.6  /  DBili  x   /  AST  16  /  ALT  24  /  AlkPhos  51  07-20

## 2023-07-20 NOTE — PROGRESS NOTE ADULT - ASSESSMENT
went to see pt  pt went for MRI abd   chart reviewed in computer  vs noted  d/w NP    labs noted    will follow pt

## 2023-07-20 NOTE — CONSULT NOTE ADULT - PROBLEM SELECTOR RECOMMENDATION 2
Gallstones. Mild gallbladder wall thickening noted. Correlate for acute cholecystitis.  NPO after MN for EGD/ EUS on am   clear liquid diet today   f/u gi recs

## 2023-07-20 NOTE — PROGRESS NOTE ADULT - SUBJECTIVE AND OBJECTIVE BOX
GI Progress Note    Patient is a 67y old  Male who presents with a chief complaint of intractable abdominal pain (20 Jul 2023 09:09)    GI was consulted for abdominal pain and pancreatic lesion    24-HOUR INTERVAL EVENTS: Patient resting in bed, offers no acute complaints, pending MRI. Tolerating diet. Tentative EGD/EUS tomorrow, patient aware and agreeable.     MEDICATIONS  (STANDING):  amLODIPine   Tablet 5 milliGRAM(s) Oral daily  aspirin  chewable 81 milliGRAM(s) Oral daily  fenofibrate Tablet 48 milliGRAM(s) Oral daily  gabapentin 100 milliGRAM(s) Oral three times a day  heparin   Injectable 5000 Unit(s) SubCutaneous every 12 hours  insulin lispro (ADMELOG) corrective regimen sliding scale   SubCutaneous three times a day before meals  insulin lispro (ADMELOG) corrective regimen sliding scale   SubCutaneous at bedtime  labetalol 100 milliGRAM(s) Oral every 8 hours  lisinopril 20 milliGRAM(s) Oral daily  pantoprazole    Tablet 40 milliGRAM(s) Oral two times a day  polyethylene glycol 3350 17 Gram(s) Oral daily  saline laxative (FLEET) Rectal Enema 1 Enema Rectal once  simvastatin 20 milliGRAM(s) Oral at bedtime  sodium chloride 0.9%. 1000 milliLiter(s) (100 mL/Hr) IV Continuous <Continuous>  sucralfate 1 Gram(s) Oral four times a day    MEDICATIONS  (PRN):  acetaminophen     Tablet .. 650 milliGRAM(s) Oral every 6 hours PRN Temp greater or equal to 38C (100.4F), Mild Pain (1 - 3), Moderate Pain (4 - 6)  aluminum hydroxide/magnesium hydroxide/simethicone Suspension 30 milliLiter(s) Oral every 4 hours PRN Dyspepsia  ondansetron Injectable 4 milliGRAM(s) IV Push every 8 hours PRN Nausea and/or Vomiting    __________________________________________________  REVIEW OF SYSTEMS:  A detailed set of ROS were asked and negative except those outlined in GI HPI above/below.   ________________________________________________  PHYSICAL EXAM    Vital Signs Last 24 Hrs  T(C): 36.8 (20 Jul 2023 04:10), Max: 36.8 (20 Jul 2023 04:10)  T(F): 98.3 (20 Jul 2023 04:10), Max: 98.3 (20 Jul 2023 04:10)  HR: 56 (20 Jul 2023 04:10) (56 - 66)  BP: 148/73 (20 Jul 2023 04:10) (129/64 - 148/73)  BP(mean): --  RR: 18 (20 Jul 2023 04:10) (18 - 18)  SpO2: 96% (20 Jul 2023 04:10) (96% - 97%)    Parameters below as of 20 Jul 2023 04:10  Patient On (Oxygen Delivery Method): room air        GEN: NAD  HEENT: EOMI, conjunctivae anicteric, neck supple, moist mucous membranes  PULM: LCTAB, no wheezing, rales, or rhonchi  CV: RRR, no m/r/g  GI: soft, NT, ND; +BS in all four quadrants, no ascites, no Doe's sign  MSK: ROBERTO, no edema  NEURO: A&O x 3, no gross deficits  _________________________________________________  LABS:                        13.9   5.50  )-----------( 181      ( 20 Jul 2023 05:48 )             43.4     07-20    139  |  112<H>  |  19<H>  ----------------------------<  129<H>  3.7   |  24  |  1.59<H>    Ca    8.9      20 Jul 2023 05:48    TPro  6.9  /  Alb  3.6  /  TBili  0.6  /  DBili  x   /  AST  16  /  ALT  24  /  AlkPhos  51  07-20      Urinalysis Basic - ( 20 Jul 2023 05:48 )    Color: x / Appearance: x / SG: x / pH: x  Gluc: 129 mg/dL / Ketone: x  / Bili: x / Urobili: x   Blood: x / Protein: x / Nitrite: x   Leuk Esterase: x / RBC: x / WBC x   Sq Epi: x / Non Sq Epi: x / Bacteria: x      CAPILLARY BLOOD GLUCOSE      POCT Blood Glucose.: 119 mg/dL (20 Jul 2023 08:03)  POCT Blood Glucose.: 194 mg/dL (19 Jul 2023 21:28)  POCT Blood Glucose.: 127 mg/dL (19 Jul 2023 16:21)  POCT Blood Glucose.: 217 mg/dL (19 Jul 2023 11:20)        RADIOLOGY & ADDITIONAL TESTS: No new imaging.

## 2023-07-20 NOTE — CONSULT NOTE ADULT - PROBLEM SELECTOR RECOMMENDATION 9
uncontrolled with hyperglycemia  a1c-8.9  on multiple oral dm meds  ? allergic to metformin  d/w pt need for basal insulin tx   refusing   cont admelog prn for now  adjust meds prior to d/c   nutrition eval  fsg ac and hs

## 2023-07-20 NOTE — PROGRESS NOTE ADULT - ASSESSMENT
67M with cholelithiasis    -MRI and workup per GI recs  -Pain control   -Likely outpatient f/u for elective lap kristina with Dr. Noriega  -Will follow

## 2023-07-20 NOTE — PROGRESS NOTE ADULT - SUBJECTIVE AND OBJECTIVE BOX
Patient is a 67y old  Male who presents with a chief complaint of intractable abdominal pain (20 Jul 2023 11:05)      INTERVAL HPI/OVERNIGHT EVENTS: no acute events noted , Pending MRCP today     REVIEW OF SYSTEMS:  CONSTITUTIONAL: No fever, chills  ENMT:  No difficulty hearing, no change in vision  NECK: No pain or stiffness  RESPIRATORY: No cough, SOB  CARDIOVASCULAR: No chest pain, palpitations  GASTROINTESTINAL: No abdominal pain. No nausea, vomiting, or diarrhea  GENITOURINARY: No dysuria  NEUROLOGICAL: No HA  SKIN: No itching, burning, rashes, or lesions   LYMPH NODES: No enlarged glands  ENDOCRINE: No heat or cold intolerance; No hair loss  MUSCULOSKELETAL: No joint pain or swelling; No muscle, back, or extremity pain  PSYCHIATRIC: No depression, anxiety  HEME/LYMPH: No easy bruising, or bleeding gums    T(C): 36.8 (07-20-23 @ 04:10), Max: 36.8 (07-20-23 @ 04:10)  HR: 56 (07-20-23 @ 04:10) (56 - 66)  BP: 148/73 (07-20-23 @ 04:10) (129/64 - 148/73)  RR: 18 (07-20-23 @ 04:10) (18 - 18)  SpO2: 96% (07-20-23 @ 04:10) (96% - 97%)  Wt(kg): --Vital Signs Last 24 Hrs  T(C): 36.8 (20 Jul 2023 04:10), Max: 36.8 (20 Jul 2023 04:10)  T(F): 98.3 (20 Jul 2023 04:10), Max: 98.3 (20 Jul 2023 04:10)  HR: 56 (20 Jul 2023 04:10) (56 - 66)  BP: 148/73 (20 Jul 2023 04:10) (129/64 - 148/73)  BP(mean): --  RR: 18 (20 Jul 2023 04:10) (18 - 18)  SpO2: 96% (20 Jul 2023 04:10) (96% - 97%)    Parameters below as of 20 Jul 2023 04:10  Patient On (Oxygen Delivery Method): room air    MEDICATIONS  (STANDING):  amLODIPine   Tablet 5 milliGRAM(s) Oral daily  aspirin  chewable 81 milliGRAM(s) Oral daily  fenofibrate Tablet 48 milliGRAM(s) Oral daily  gabapentin 100 milliGRAM(s) Oral three times a day  heparin   Injectable 5000 Unit(s) SubCutaneous every 12 hours  insulin lispro (ADMELOG) corrective regimen sliding scale   SubCutaneous three times a day before meals  insulin lispro (ADMELOG) corrective regimen sliding scale   SubCutaneous at bedtime  labetalol 100 milliGRAM(s) Oral every 8 hours  lisinopril 20 milliGRAM(s) Oral daily  pantoprazole    Tablet 40 milliGRAM(s) Oral two times a day  polyethylene glycol 3350 17 Gram(s) Oral daily  saline laxative (FLEET) Rectal Enema 1 Enema Rectal once  simvastatin 20 milliGRAM(s) Oral at bedtime  sodium chloride 0.9%. 1000 milliLiter(s) (100 mL/Hr) IV Continuous <Continuous>  sucralfate 1 Gram(s) Oral four times a day    MEDICATIONS  (PRN):  acetaminophen     Tablet .. 650 milliGRAM(s) Oral every 6 hours PRN Temp greater or equal to 38C (100.4F), Mild Pain (1 - 3), Moderate Pain (4 - 6)  aluminum hydroxide/magnesium hydroxide/simethicone Suspension 30 milliLiter(s) Oral every 4 hours PRN Dyspepsia  ondansetron Injectable 4 milliGRAM(s) IV Push every 8 hours PRN Nausea and/or Vomiting      PHYSICAL EXAM:  GENERAL: NAD  EYES: clear conjunctiva; EOMI  ENMT: Moist mucous membranes  NECK: Supple, No JVD, Normal thyroid  CHEST/LUNG: Clear to auscultation bilaterally; No rales, rhonchi, wheezing, or rubs  HEART: S1, S2, Regular rate and rhythm  ABDOMEN: Soft, Nontender, Nondistended; Bowel sounds present  NEURO: Alert & Oriented X3  EXTREMITIES: No LE edema, no calf tenderness  LYMPH: No lymphadenopathy noted  SKIN: No rashes or lesions    Consultant(s) Notes Reviewed:  [x ] YES  [ ] NO  Care Discussed with Consultants/Other Providers [ x] YES  [ ] NO    LABS:                        13.9   5.50  )-----------( 181      ( 20 Jul 2023 05:48 )             43.4     07-20    139  |  112<H>  |  19<H>  ----------------------------<  129<H>  3.7   |  24  |  1.59<H>    Ca    8.9      20 Jul 2023 05:48    TPro  6.9  /  Alb  3.6  /  TBili  0.6  /  DBili  x   /  AST  16  /  ALT  24  /  AlkPhos  51  07-20      CAPILLARY BLOOD GLUCOSE      POCT Blood Glucose.: 119 mg/dL (20 Jul 2023 08:03)  POCT Blood Glucose.: 194 mg/dL (19 Jul 2023 21:28)  POCT Blood Glucose.: 127 mg/dL (19 Jul 2023 16:21)  POCT Blood Glucose.: 217 mg/dL (19 Jul 2023 11:20)        Urinalysis Basic - ( 20 Jul 2023 05:48 )    Color: x / Appearance: x / SG: x / pH: x  Gluc: 129 mg/dL / Ketone: x  / Bili: x / Urobili: x   Blood: x / Protein: x / Nitrite: x   Leuk Esterase: x / RBC: x / WBC x   Sq Epi: x / Non Sq Epi: x / Bacteria: x        RADIOLOGY & ADDITIONAL TESTS:    Imaging Personally Reviewed:  [x ] YES  [ ] NO  < from: US Abdomen Upper Quadrant Right (07.17.23 @ 18:10) >    ACC: [id number] EXAM:  US ABDOMEN RT UPR QUADRANT   ORDERED BY: MARILEE PRINCE     PROCEDURE DATE:  07/17/2023          INTERPRETATION:  CLINICAL INFORMATION: Mid abdominal pain    COMPARISON: CT abdomen pelvis performed on the same day.    TECHNIQUE: Sonography of the right upper quadrant.    FINDINGS:  Liver: Increased echogenicity.  Bile ducts: Normal caliber. Common bile duct measures 5 mm.  Gallbladder: Gallstones without wall thickening or pericholecystic fluid.   Sonographic Doe's sign is negative.  Pancreas: Visualized portions are within normal limits.  Right kidney: 10.3 cm. No hydronephrosis.  Ascites: None.  IVC: Visualized portions are within normal limits.    IMPRESSION:  Gallstones without sonographic evidence of acute cholecystitis.    Fatty infiltration of the liver.        --- End of Report ---            MARY ALCAZAR MD; Attending Radiologist  This document has been electronically signed. Jul 17 2023  6:14PM    < end of copied text >

## 2023-07-21 ENCOUNTER — TRANSCRIPTION ENCOUNTER (OUTPATIENT)
Age: 68
End: 2023-07-21

## 2023-07-21 VITALS
TEMPERATURE: 98 F | RESPIRATION RATE: 16 BRPM | DIASTOLIC BLOOD PRESSURE: 83 MMHG | HEART RATE: 75 BPM | SYSTOLIC BLOOD PRESSURE: 155 MMHG | OXYGEN SATURATION: 94 %

## 2023-07-21 LAB
ALBUMIN SERPL ELPH-MCNC: 3.9 G/DL — SIGNIFICANT CHANGE UP (ref 3.5–5)
ALP SERPL-CCNC: 55 U/L — SIGNIFICANT CHANGE UP (ref 40–120)
ALT FLD-CCNC: 40 U/L DA — SIGNIFICANT CHANGE UP (ref 10–60)
ANION GAP SERPL CALC-SCNC: 10 MMOL/L — SIGNIFICANT CHANGE UP (ref 5–17)
APTT BLD: 35 SEC — SIGNIFICANT CHANGE UP (ref 27.5–35.5)
AST SERPL-CCNC: 43 U/L — HIGH (ref 10–40)
BILIRUB SERPL-MCNC: 0.9 MG/DL — SIGNIFICANT CHANGE UP (ref 0.2–1.2)
BLD GP AB SCN SERPL QL: SIGNIFICANT CHANGE UP
BUN SERPL-MCNC: 20 MG/DL — HIGH (ref 7–18)
CALCIUM SERPL-MCNC: 9.7 MG/DL — SIGNIFICANT CHANGE UP (ref 8.4–10.5)
CHLORIDE SERPL-SCNC: 108 MMOL/L — SIGNIFICANT CHANGE UP (ref 96–108)
CO2 SERPL-SCNC: 20 MMOL/L — LOW (ref 22–31)
CREAT SERPL-MCNC: 1.68 MG/DL — HIGH (ref 0.5–1.3)
EGFR: 44 ML/MIN/1.73M2 — LOW
GLUCOSE BLDC GLUCOMTR-MCNC: 107 MG/DL — HIGH (ref 70–99)
GLUCOSE BLDC GLUCOMTR-MCNC: 141 MG/DL — HIGH (ref 70–99)
GLUCOSE BLDC GLUCOMTR-MCNC: 159 MG/DL — HIGH (ref 70–99)
GLUCOSE BLDC GLUCOMTR-MCNC: 160 MG/DL — HIGH (ref 70–99)
GLUCOSE BLDC GLUCOMTR-MCNC: 189 MG/DL — HIGH (ref 70–99)
GLUCOSE SERPL-MCNC: 176 MG/DL — HIGH (ref 70–99)
HCT VFR BLD CALC: 48.1 % — SIGNIFICANT CHANGE UP (ref 39–50)
HGB BLD-MCNC: 15.5 G/DL — SIGNIFICANT CHANGE UP (ref 13–17)
INR BLD: 1.01 RATIO — SIGNIFICANT CHANGE UP (ref 0.88–1.16)
MCHC RBC-ENTMCNC: 25.7 PG — LOW (ref 27–34)
MCHC RBC-ENTMCNC: 32.2 GM/DL — SIGNIFICANT CHANGE UP (ref 32–36)
MCV RBC AUTO: 79.9 FL — LOW (ref 80–100)
NRBC # BLD: 0 /100 WBCS — SIGNIFICANT CHANGE UP (ref 0–0)
PLATELET # BLD AUTO: 196 K/UL — SIGNIFICANT CHANGE UP (ref 150–400)
POTASSIUM SERPL-MCNC: 4 MMOL/L — SIGNIFICANT CHANGE UP (ref 3.5–5.3)
POTASSIUM SERPL-SCNC: 4 MMOL/L — SIGNIFICANT CHANGE UP (ref 3.5–5.3)
PROT SERPL-MCNC: 7.8 G/DL — SIGNIFICANT CHANGE UP (ref 6–8.3)
PROTHROM AB SERPL-ACNC: 12 SEC — SIGNIFICANT CHANGE UP (ref 10.5–13.4)
RBC # BLD: 6.02 M/UL — HIGH (ref 4.2–5.8)
RBC # FLD: 14.5 % — SIGNIFICANT CHANGE UP (ref 10.3–14.5)
SODIUM SERPL-SCNC: 138 MMOL/L — SIGNIFICANT CHANGE UP (ref 135–145)
TROPONIN I, HIGH SENSITIVITY RESULT: 8.2 NG/L — SIGNIFICANT CHANGE UP
WBC # BLD: 6.19 K/UL — SIGNIFICANT CHANGE UP (ref 3.8–10.5)
WBC # FLD AUTO: 6.19 K/UL — SIGNIFICANT CHANGE UP (ref 3.8–10.5)

## 2023-07-21 PROCEDURE — 80048 BASIC METABOLIC PNL TOTAL CA: CPT

## 2023-07-21 PROCEDURE — 85730 THROMBOPLASTIN TIME PARTIAL: CPT

## 2023-07-21 PROCEDURE — 86850 RBC ANTIBODY SCREEN: CPT

## 2023-07-21 PROCEDURE — 84484 ASSAY OF TROPONIN QUANT: CPT

## 2023-07-21 PROCEDURE — 86803 HEPATITIS C AB TEST: CPT

## 2023-07-21 PROCEDURE — 86901 BLOOD TYPING SEROLOGIC RH(D): CPT

## 2023-07-21 PROCEDURE — 99285 EMERGENCY DEPT VISIT HI MDM: CPT

## 2023-07-21 PROCEDURE — 74183 MRI ABD W/O CNTR FLWD CNTR: CPT

## 2023-07-21 PROCEDURE — 83690 ASSAY OF LIPASE: CPT

## 2023-07-21 PROCEDURE — A9585: CPT

## 2023-07-21 PROCEDURE — 36415 COLL VENOUS BLD VENIPUNCTURE: CPT

## 2023-07-21 PROCEDURE — 83735 ASSAY OF MAGNESIUM: CPT

## 2023-07-21 PROCEDURE — 96375 TX/PRO/DX INJ NEW DRUG ADDON: CPT

## 2023-07-21 PROCEDURE — 93306 TTE W/DOPPLER COMPLETE: CPT

## 2023-07-21 PROCEDURE — 83605 ASSAY OF LACTIC ACID: CPT

## 2023-07-21 PROCEDURE — 83036 HEMOGLOBIN GLYCOSYLATED A1C: CPT

## 2023-07-21 PROCEDURE — 85610 PROTHROMBIN TIME: CPT

## 2023-07-21 PROCEDURE — 85027 COMPLETE CBC AUTOMATED: CPT

## 2023-07-21 PROCEDURE — 86301 IMMUNOASSAY TUMOR CA 19-9: CPT

## 2023-07-21 PROCEDURE — 82378 CARCINOEMBRYONIC ANTIGEN: CPT

## 2023-07-21 PROCEDURE — 96374 THER/PROPH/DIAG INJ IV PUSH: CPT

## 2023-07-21 PROCEDURE — 87086 URINE CULTURE/COLONY COUNT: CPT

## 2023-07-21 PROCEDURE — 86900 BLOOD TYPING SEROLOGIC ABO: CPT

## 2023-07-21 PROCEDURE — 93005 ELECTROCARDIOGRAM TRACING: CPT

## 2023-07-21 PROCEDURE — 85025 COMPLETE CBC W/AUTO DIFF WBC: CPT

## 2023-07-21 PROCEDURE — 76705 ECHO EXAM OF ABDOMEN: CPT

## 2023-07-21 PROCEDURE — 74177 CT ABD & PELVIS W/CONTRAST: CPT | Mod: MA

## 2023-07-21 PROCEDURE — 80053 COMPREHEN METABOLIC PANEL: CPT

## 2023-07-21 PROCEDURE — 81003 URINALYSIS AUTO W/O SCOPE: CPT

## 2023-07-21 PROCEDURE — 84100 ASSAY OF PHOSPHORUS: CPT

## 2023-07-21 PROCEDURE — 93010 ELECTROCARDIOGRAM REPORT: CPT

## 2023-07-21 PROCEDURE — 82962 GLUCOSE BLOOD TEST: CPT

## 2023-07-21 PROCEDURE — 82105 ALPHA-FETOPROTEIN SERUM: CPT

## 2023-07-21 RX ORDER — GLIMEPIRIDE 1 MG
1 TABLET ORAL
Qty: 30 | Refills: 0
Start: 2023-07-21 | End: 2023-08-19

## 2023-07-21 RX ORDER — PANTOPRAZOLE SODIUM 20 MG/1
1 TABLET, DELAYED RELEASE ORAL
Qty: 14 | Refills: 0
Start: 2023-07-21 | End: 2023-07-27

## 2023-07-21 RX ORDER — REPAGLINIDE 1 MG/1
1 TABLET ORAL
Qty: 30 | Refills: 0
Start: 2023-07-21 | End: 2023-08-19

## 2023-07-21 RX ORDER — SITAGLIPTIN 50 MG/1
1 TABLET, FILM COATED ORAL
Qty: 30 | Refills: 0
Start: 2023-07-21 | End: 2023-08-19

## 2023-07-21 RX ORDER — SIMVASTATIN 20 MG/1
1 TABLET, FILM COATED ORAL
Refills: 0 | DISCHARGE

## 2023-07-21 RX ORDER — GABAPENTIN 400 MG/1
1 CAPSULE ORAL
Qty: 90 | Refills: 0
Start: 2023-07-21 | End: 2023-08-19

## 2023-07-21 RX ORDER — AMLODIPINE BESYLATE 2.5 MG/1
1 TABLET ORAL
Refills: 0 | DISCHARGE

## 2023-07-21 RX ORDER — LABETALOL HCL 100 MG
1 TABLET ORAL
Qty: 90 | Refills: 0
Start: 2023-07-21 | End: 2023-08-19

## 2023-07-21 RX ORDER — EMPAGLIFLOZIN 10 MG/1
1 TABLET, FILM COATED ORAL
Qty: 30 | Refills: 0
Start: 2023-07-21 | End: 2023-08-19

## 2023-07-21 RX ORDER — HALOPERIDOL DECANOATE 100 MG/ML
5 INJECTION INTRAMUSCULAR ONCE
Refills: 0 | Status: COMPLETED | OUTPATIENT
Start: 2023-07-21 | End: 2023-07-21

## 2023-07-21 RX ORDER — FOSINOPRIL SODIUM 10 MG/1
1 TABLET ORAL
Qty: 30 | Refills: 0
Start: 2023-07-21 | End: 2023-08-19

## 2023-07-21 RX ORDER — GABAPENTIN 400 MG/1
1 CAPSULE ORAL
Refills: 0 | DISCHARGE

## 2023-07-21 RX ORDER — SITAGLIPTIN 50 MG/1
1 TABLET, FILM COATED ORAL
Refills: 0 | DISCHARGE

## 2023-07-21 RX ORDER — EMPAGLIFLOZIN 10 MG/1
1 TABLET, FILM COATED ORAL
Refills: 0 | DISCHARGE

## 2023-07-21 RX ORDER — LABETALOL HCL 100 MG
1 TABLET ORAL
Refills: 0 | DISCHARGE

## 2023-07-21 RX ORDER — REPAGLINIDE 1 MG/1
1 TABLET ORAL
Refills: 0 | DISCHARGE

## 2023-07-21 RX ORDER — GLIMEPIRIDE 1 MG
1 TABLET ORAL
Refills: 0 | DISCHARGE

## 2023-07-21 RX ORDER — SIMVASTATIN 20 MG/1
1 TABLET, FILM COATED ORAL
Qty: 30 | Refills: 0
Start: 2023-07-21 | End: 2023-08-19

## 2023-07-21 RX ORDER — AMLODIPINE BESYLATE 2.5 MG/1
1 TABLET ORAL
Qty: 30 | Refills: 0
Start: 2023-07-21 | End: 2023-08-19

## 2023-07-21 RX ORDER — FOSINOPRIL SODIUM 10 MG/1
1 TABLET ORAL
Refills: 0 | DISCHARGE

## 2023-07-21 RX ORDER — SUCRALFATE 1 G
1 TABLET ORAL
Qty: 28 | Refills: 0
Start: 2023-07-21 | End: 2023-07-27

## 2023-07-21 RX ORDER — FENOFIBRATE,MICRONIZED 130 MG
1 CAPSULE ORAL
Qty: 30 | Refills: 0
Start: 2023-07-21 | End: 2023-08-19

## 2023-07-21 RX ORDER — FENOFIBRATE,MICRONIZED 130 MG
1 CAPSULE ORAL
Refills: 0 | DISCHARGE

## 2023-07-21 RX ORDER — DIPHENHYDRAMINE HCL 50 MG
10 CAPSULE ORAL ONCE
Refills: 0 | Status: COMPLETED | OUTPATIENT
Start: 2023-07-21 | End: 2023-07-21

## 2023-07-21 RX ADMIN — Medication 48 MILLIGRAM(S): at 15:18

## 2023-07-21 RX ADMIN — PANTOPRAZOLE SODIUM 40 MILLIGRAM(S): 20 TABLET, DELAYED RELEASE ORAL at 17:45

## 2023-07-21 RX ADMIN — AMLODIPINE BESYLATE 5 MILLIGRAM(S): 2.5 TABLET ORAL at 05:44

## 2023-07-21 RX ADMIN — Medication 81 MILLIGRAM(S): at 15:18

## 2023-07-21 RX ADMIN — GABAPENTIN 100 MILLIGRAM(S): 400 CAPSULE ORAL at 15:17

## 2023-07-21 RX ADMIN — Medication 100 MILLIGRAM(S): at 05:44

## 2023-07-21 RX ADMIN — HALOPERIDOL DECANOATE 5 MILLIGRAM(S): 100 INJECTION INTRAMUSCULAR at 02:30

## 2023-07-21 RX ADMIN — Medication 10 MILLIGRAM(S): at 02:55

## 2023-07-21 RX ADMIN — GABAPENTIN 100 MILLIGRAM(S): 400 CAPSULE ORAL at 05:44

## 2023-07-21 RX ADMIN — Medication 1 GRAM(S): at 15:18

## 2023-07-21 RX ADMIN — PANTOPRAZOLE SODIUM 40 MILLIGRAM(S): 20 TABLET, DELAYED RELEASE ORAL at 05:46

## 2023-07-21 RX ADMIN — LISINOPRIL 20 MILLIGRAM(S): 2.5 TABLET ORAL at 05:45

## 2023-07-21 RX ADMIN — Medication 100 MILLIGRAM(S): at 15:18

## 2023-07-21 RX ADMIN — HEPARIN SODIUM 5000 UNIT(S): 5000 INJECTION INTRAVENOUS; SUBCUTANEOUS at 17:45

## 2023-07-21 RX ADMIN — Medication 1: at 16:21

## 2023-07-21 NOTE — DISCHARGE NOTE PROVIDER - NSDCMRMEDTOKEN_GEN_ALL_CORE_FT
amLODIPine 5 mg oral tablet: 1 tab(s) orally once a day  aspirin 81 mg oral tablet: 1 tab(s) orally once a day  fenofibrate 48 mg oral tablet: 1 tab(s) orally once a day  fosinopril 20 mg oral tablet: 1 tab(s) orally once a day  gabapentin 100 mg oral tablet: 1 tab(s) orally 3 times a day  glimepiride 1 mg oral tablet: 1 tab(s) orally once a day  Januvia 100 mg oral tablet: 1 tab(s) orally once a day  Jardiance 10 mg oral tablet: 1 tab(s) orally once a day  labetalol 100 mg oral tablet: 1 tab(s) orally every 8 hours  repaglinide 2 mg oral tablet: 1 tab(s) orally once a day  simvastatin 20 mg oral tablet: 1 tab(s) orally once a day (at bedtime)   aluminum hydroxide-magnesium hydroxide 200 mg-200 mg/5 mL oral suspension: 30 milliliter(s) orally every 4 hours As needed Dyspepsia  amLODIPine 5 mg oral tablet: 1 tab(s) orally once a day  aspirin 81 mg oral tablet: 1 tab(s) orally once a day  fenofibrate 48 mg oral tablet: 1 tab(s) orally once a day  fosinopril 20 mg oral tablet: 1 tab(s) orally once a day  gabapentin 100 mg oral capsule: 1 cap(s) orally 3 times a day  glimepiride 1 mg oral tablet: 1 tab(s) orally once a day  Januvia 100 mg oral tablet: 1 tab(s) orally once a day  Jardiance 10 mg oral tablet: 1 tab(s) orally once a day  labetalol 100 mg oral tablet: 1 tab(s) orally every 8 hours  pantoprazole 40 mg oral delayed release tablet: 1 tab(s) orally 2 times a day  repaglinide 2 mg oral tablet: 1 tab(s) orally once a day  simvastatin 20 mg oral tablet: 1 tab(s) orally once a day (at bedtime)  sucralfate 1 g oral tablet: 1 tab(s) orally 4 times a day

## 2023-07-21 NOTE — PROGRESS NOTE ADULT - PROBLEM SELECTOR PROBLEM 2
Chronic kidney disease (CKD)
Abnormal CT scan
Chronic kidney disease (CKD)
Chronic kidney disease (CKD)

## 2023-07-21 NOTE — DISCHARGE NOTE PROVIDER - PROVIDER TOKENS
PROVIDER:[TOKEN:[[id number]],FOLLOWUP:[1 week],ESTABLISHEDPATIENT:[T]],PROVIDER:[TOKEN:[[id number]],FOLLOWUP:[1 week]]

## 2023-07-21 NOTE — PROGRESS NOTE ADULT - SUBJECTIVE AND OBJECTIVE BOX
NP Note discussed with  Primary Attending    Patient is a 67y old  Male who presents with a chief complaint of intractable abdominal pain (20 Jul 2023 17:41)      INTERVAL HPI/OVERNIGHT EVENTS: no new complaints    MEDICATIONS  (STANDING):  amLODIPine   Tablet 5 milliGRAM(s) Oral daily  aspirin  chewable 81 milliGRAM(s) Oral daily  fenofibrate Tablet 48 milliGRAM(s) Oral daily  gabapentin 100 milliGRAM(s) Oral three times a day  heparin   Injectable 5000 Unit(s) SubCutaneous every 12 hours  insulin lispro (ADMELOG) corrective regimen sliding scale   SubCutaneous three times a day before meals  insulin lispro (ADMELOG) corrective regimen sliding scale   SubCutaneous at bedtime  labetalol 100 milliGRAM(s) Oral every 8 hours  lisinopril 20 milliGRAM(s) Oral daily  pantoprazole    Tablet 40 milliGRAM(s) Oral two times a day  polyethylene glycol 3350 17 Gram(s) Oral daily  saline laxative (FLEET) Rectal Enema 1 Enema Rectal once  simvastatin 20 milliGRAM(s) Oral at bedtime  sodium chloride 0.9%. 1000 milliLiter(s) (100 mL/Hr) IV Continuous <Continuous>  sucralfate 1 Gram(s) Oral four times a day    MEDICATIONS  (PRN):  acetaminophen     Tablet .. 650 milliGRAM(s) Oral every 6 hours PRN Temp greater or equal to 38C (100.4F), Mild Pain (1 - 3), Moderate Pain (4 - 6)  aluminum hydroxide/magnesium hydroxide/simethicone Suspension 30 milliLiter(s) Oral every 4 hours PRN Dyspepsia  ondansetron Injectable 4 milliGRAM(s) IV Push every 8 hours PRN Nausea and/or Vomiting      __________________________________________________  REVIEW OF SYSTEMS:    CONSTITUTIONAL: No fever,   EYES: no acute visual disturbances  NECK: No pain or stiffness  RESPIRATORY: No cough; No shortness of breath  CARDIOVASCULAR: No chest pain, no palpitations  GASTROINTESTINAL: No pain. No nausea or vomiting; No diarrhea   NEUROLOGICAL: No headache or numbness, no tremors  MUSCULOSKELETAL: No joint pain, no muscle pain  GENITOURINARY: no dysuria, no frequency, no hesitancy  PSYCHIATRY: no depression , no anxiety  ALL OTHER  ROS negative        Vital Signs Last 24 Hrs  T(C): 37 (21 Jul 2023 05:25), Max: 37.1 (20 Jul 2023 12:29)  T(F): 98.6 (21 Jul 2023 05:25), Max: 98.8 (20 Jul 2023 12:29)  HR: 66 (21 Jul 2023 05:25) (55 - 66)  BP: 166/88 (21 Jul 2023 05:25) (116/62 - 166/88)  BP(mean): --  RR: 17 (21 Jul 2023 05:25) (16 - 17)  SpO2: 96% (21 Jul 2023 05:25) (95% - 96%)    Parameters below as of 21 Jul 2023 05:25  Patient On (Oxygen Delivery Method): room air        ________________________________________________  PHYSICAL EXAM:  GENERAL: NAD  HEENT: Normocephalic;  conjunctivae and sclerae clear; moist mucous membranes;   NECK : supple  CHEST/LUNG: Clear to auscultation bilaterally with good air entry   HEART: S1 S2  regular; no murmurs, gallops or rubs  ABDOMEN: Soft, Nontender, Nondistended; Bowel sounds present  EXTREMITIES: no cyanosis; no edema; no calf tenderness  SKIN: warm and dry; no rash  NERVOUS SYSTEM:  Awake and alert; Oriented  to place, person and time ; no new deficits    _________________________________________________  LABS:                        15.5   6.19  )-----------( 196      ( 21 Jul 2023 07:12 )             48.1     07-21    138  |  108  |  20<H>  ----------------------------<  176<H>  4.0   |  20<L>  |  1.68<H>    Ca    9.7      21 Jul 2023 07:12    TPro  7.8  /  Alb  3.9  /  TBili  0.9  /  DBili  x   /  AST  43<H>  /  ALT  40  /  AlkPhos  55  07-21    PT/INR - ( 21 Jul 2023 07:12 )   PT: 12.0 sec;   INR: 1.01 ratio         PTT - ( 21 Jul 2023 07:12 )  PTT:35.0 sec  Urinalysis Basic - ( 21 Jul 2023 07:12 )    Color: x / Appearance: x / SG: x / pH: x  Gluc: 176 mg/dL / Ketone: x  / Bili: x / Urobili: x   Blood: x / Protein: x / Nitrite: x   Leuk Esterase: x / RBC: x / WBC x   Sq Epi: x / Non Sq Epi: x / Bacteria: x      CAPILLARY BLOOD GLUCOSE      POCT Blood Glucose.: 160 mg/dL (21 Jul 2023 06:33)  POCT Blood Glucose.: 159 mg/dL (21 Jul 2023 06:07)  POCT Blood Glucose.: 141 mg/dL (21 Jul 2023 00:02)  POCT Blood Glucose.: 165 mg/dL (20 Jul 2023 21:37)  POCT Blood Glucose.: 223 mg/dL (20 Jul 2023 16:29)  POCT Blood Glucose.: 226 mg/dL (20 Jul 2023 11:28)        RADIOLOGY & ADDITIONAL TESTS:    Imaging  Reviewed:  YES/NO    Consultant(s) Notes Reviewed:   YES/ No      Plan of care was discussed with patient and /or primary care giver; all questions and concerns were addressed  NP Note discussed with  Primary Attending    Patient is a 67y old  Male who presents with a chief complaint of intractable abdominal pain (20 Jul 2023 17:41)      INTERVAL HPI/OVERNIGHT EVENTS: Code Gray overnight for agitation     MEDICATIONS  (STANDING):  amLODIPine   Tablet 5 milliGRAM(s) Oral daily  aspirin  chewable 81 milliGRAM(s) Oral daily  fenofibrate Tablet 48 milliGRAM(s) Oral daily  gabapentin 100 milliGRAM(s) Oral three times a day  heparin   Injectable 5000 Unit(s) SubCutaneous every 12 hours  insulin lispro (ADMELOG) corrective regimen sliding scale   SubCutaneous three times a day before meals  insulin lispro (ADMELOG) corrective regimen sliding scale   SubCutaneous at bedtime  labetalol 100 milliGRAM(s) Oral every 8 hours  lisinopril 20 milliGRAM(s) Oral daily  pantoprazole    Tablet 40 milliGRAM(s) Oral two times a day  polyethylene glycol 3350 17 Gram(s) Oral daily  saline laxative (FLEET) Rectal Enema 1 Enema Rectal once  simvastatin 20 milliGRAM(s) Oral at bedtime  sodium chloride 0.9%. 1000 milliLiter(s) (100 mL/Hr) IV Continuous <Continuous>  sucralfate 1 Gram(s) Oral four times a day    MEDICATIONS  (PRN):  acetaminophen     Tablet .. 650 milliGRAM(s) Oral every 6 hours PRN Temp greater or equal to 38C (100.4F), Mild Pain (1 - 3), Moderate Pain (4 - 6)  aluminum hydroxide/magnesium hydroxide/simethicone Suspension 30 milliLiter(s) Oral every 4 hours PRN Dyspepsia  ondansetron Injectable 4 milliGRAM(s) IV Push every 8 hours PRN Nausea and/or Vomiting      __________________________________________________  REVIEW OF SYSTEMS:    CONSTITUTIONAL: No fever,   EYES: no acute visual disturbances  NECK: No pain or stiffness  RESPIRATORY: No cough; No shortness of breath  CARDIOVASCULAR: No chest pain, no palpitations  GASTROINTESTINAL: +abd pain.  No nausea or vomiting; No diarrhea   NEUROLOGICAL: No headache or numbness, no tremors  MUSCULOSKELETAL: No joint pain, no muscle pain  GENITOURINARY: no dysuria, no frequency, no hesitancy  PSYCHIATRY: no depression , no anxiety  ALL OTHER  ROS negative        Vital Signs Last 24 Hrs  T(C): 37 (21 Jul 2023 05:25), Max: 37.1 (20 Jul 2023 12:29)  T(F): 98.6 (21 Jul 2023 05:25), Max: 98.8 (20 Jul 2023 12:29)  HR: 66 (21 Jul 2023 05:25) (55 - 66)  BP: 166/88 (21 Jul 2023 05:25) (116/62 - 166/88)  BP(mean): --  RR: 17 (21 Jul 2023 05:25) (16 - 17)  SpO2: 96% (21 Jul 2023 05:25) (95% - 96%)    Parameters below as of 21 Jul 2023 05:25  Patient On (Oxygen Delivery Method): room air        ________________________________________________  PHYSICAL EXAM:  GENERAL: NAD. laying supine in bed  HEENT: Normocephalic;  conjunctivae and sclerae clear; moist mucous membranes;   NECK : supple  CHEST/LUNG: Clear to auscultation bilaterally with good air entry   HEART: S1 S2  regular; no murmurs, gallops or rubs  ABDOMEN: Soft, Nontender, Nondistended; Bowel sounds present  EXTREMITIES: no cyanosis; no edema; no calf tenderness  SKIN: warm and dry; no rash  NERVOUS SYSTEM:  Awake and alert; Oriented  to place, person and time ; no new deficits    _________________________________________________  LABS:                        15.5   6.19  )-----------( 196      ( 21 Jul 2023 07:12 )             48.1     07-21    138  |  108  |  20<H>  ----------------------------<  176<H>  4.0   |  20<L>  |  1.68<H>    Ca    9.7      21 Jul 2023 07:12    TPro  7.8  /  Alb  3.9  /  TBili  0.9  /  DBili  x   /  AST  43<H>  /  ALT  40  /  AlkPhos  55  07-21    PT/INR - ( 21 Jul 2023 07:12 )   PT: 12.0 sec;   INR: 1.01 ratio         PTT - ( 21 Jul 2023 07:12 )  PTT:35.0 sec  Urinalysis Basic - ( 21 Jul 2023 07:12 )    Color: x / Appearance: x / SG: x / pH: x  Gluc: 176 mg/dL / Ketone: x  / Bili: x / Urobili: x   Blood: x / Protein: x / Nitrite: x   Leuk Esterase: x / RBC: x / WBC x   Sq Epi: x / Non Sq Epi: x / Bacteria: x      CAPILLARY BLOOD GLUCOSE      POCT Blood Glucose.: 160 mg/dL (21 Jul 2023 06:33)  POCT Blood Glucose.: 159 mg/dL (21 Jul 2023 06:07)  POCT Blood Glucose.: 141 mg/dL (21 Jul 2023 00:02)  POCT Blood Glucose.: 165 mg/dL (20 Jul 2023 21:37)  POCT Blood Glucose.: 223 mg/dL (20 Jul 2023 16:29)  POCT Blood Glucose.: 226 mg/dL (20 Jul 2023 11:28)        RADIOLOGY & ADDITIONAL TESTS:    Imaging  Reviewed:  YES    Consultant(s) Notes Reviewed:   YES      Plan of care was discussed with patient; all questions and concerns were addressed

## 2023-07-21 NOTE — CHART NOTE - NSCHARTNOTEFT_GEN_A_CORE
EVENT: Received telephone call from RN that pt is screaming for hot water & is very abusive towards staff. Code Quinones called.    HPI:  68 y/o M from home, with PMHx HTN, HLD, DM, and CKD who presents with abdominal pain. Admitted for intractable abdominal pain. CT A/P - Gallstones. Mild gallbladder wall thickening noted. Correlate for acute cholecystitis. RUQ ultrasound - Gallstones without sonographic evidence of acute cholecystitis  Pending MRCP. PLan for EUS/EGD on Friday 7/21. GI following     SUBJECTIVE: "I don't care about myself, I want to drink some hot water but I was given dirty water"       OBJECTIVE:  Vital Signs Last 24 Hrs  T(C): 36.8 (20 Jul 2023 20:30), Max: 37.1 (20 Jul 2023 12:29)  T(F): 98.3 (20 Jul 2023 20:30), Max: 98.8 (20 Jul 2023 12:29)  HR: 61 (20 Jul 2023 20:30) (55 - 61)  BP: 116/62 (20 Jul 2023 20:30) (116/62 - 148/73)  BP(mean): --  RR: 16 (20 Jul 2023 20:30) (16 - 18)  SpO2: 96% (20 Jul 2023 20:30) (95% - 96%)    Parameters below as of 20 Jul 2023 20:30  Patient On (Oxygen Delivery Method): room air      FOCUSED PHYSICAL EXAM:  Neuro: awake, alert, Very agitated & abusive  Cardiovascular: Pulses +2 B/L in lower and upper extremities, HR regular, BP stable, No edema.  Respiratory: Respirations regular, unlabored, breath sounds clear B/L.   GI: Abdomen soft, non-tender, positive bowel sounds.  : no bladder distention noted. No complaints at this time.  Skin: Dry, intact, no bruising, no diaphoresis.    LABS:                        13.9   5.50  )-----------( 181      ( 20 Jul 2023 05:48 )             43.4     07-20    139  |  112<H>  |  19<H>  ----------------------------<  129<H>  3.7   |  24  |  1.59<H>    Ca    8.9      20 Jul 2023 05:48    TPro  6.9  /  Alb  3.6  /  TBili  0.6  /  DBili  x   /  AST  16  /  ALT  24  /  AlkPhos  51  07-20      EKG:   IMAGING:    ASSESSMENT/ PROBLEM: Agitation      PLAN:   1. Code Quinones/hospital security at bedsides speaking to pt but pt still agitated & screaming  2. Haldol 5 mg, IM x 1 dose ordered  3. Monitor response to treatment  4. Cont present care/treatment  5. Supportive care EVENT: Received telephone call from RN that pt is screaming for hot water & is very abusive towards staff. Darnell Quinones called.  Threatening & saying to the RN that "I would kill you"    HPI:  66 y/o M from home, with PMHx HTN, HLD, DM, and CKD who presents with abdominal pain. Admitted for intractable abdominal pain. CT A/P - Gallstones. Mild gallbladder wall thickening noted. Correlate for acute cholecystitis. RUQ ultrasound - Gallstones without sonographic evidence of acute cholecystitis. For  EUS/EGD on Friday 7/21. GI following     SUBJECTIVE: "I don't care about myself, I want to drink some hot water but I was given dirty water"       OBJECTIVE:  Vital Signs Last 24 Hrs  T(C): 36.8 (20 Jul 2023 20:30), Max: 37.1 (20 Jul 2023 12:29)  T(F): 98.3 (20 Jul 2023 20:30), Max: 98.8 (20 Jul 2023 12:29)  HR: 61 (20 Jul 2023 20:30) (55 - 61)  BP: 116/62 (20 Jul 2023 20:30) (116/62 - 148/73)  BP(mean): --  RR: 16 (20 Jul 2023 20:30) (16 - 18)  SpO2: 96% (20 Jul 2023 20:30) (95% - 96%)    Parameters below as of 20 Jul 2023 20:30  Patient On (Oxygen Delivery Method): room air      FOCUSED PHYSICAL EXAM:  Neuro: awake, alert, Very agitated & abusive  Cardiovascular: Pulses +2 B/L in lower and upper extremities, HR regular, BP stable, No edema.  Respiratory: Respirations regular, unlabored, breath sounds clear B/L.   GI: Abdomen soft, non-tender, positive bowel sounds.  : no bladder distention noted. No complaints at this time.  Skin: Dry, intact, no bruising, no diaphoresis.    LABS:                        13.9   5.50  )-----------( 181      ( 20 Jul 2023 05:48 )             43.4     07-20    139  |  112<H>  |  19<H>  ----------------------------<  129<H>  3.7   |  24  |  1.59<H>    Ca    8.9      20 Jul 2023 05:48    TPro  6.9  /  Alb  3.6  /  TBili  0.6  /  DBili  x   /  AST  16  /  ALT  24  /  AlkPhos  51  07-20      EKG:   IMAGING:    ASSESSMENT/ PROBLEM: Agitation      PLAN:   1. Code Quinones/hospital security in the hallway speaking to pt but pt still agitated & screaming  2. Haldol 5 mg, IM x 1 dose ordered  3. Monitor response to treatment  4. Cont present care/treatment  5. Supportive care    ADDENDUM: Pt is threatening to jump out through the window if hospital police does not leave the unit. Pt was placed on 1:1 for suicidal ideation. EVENT: Received telephone call from RN that pt is screaming for hot water & is very abusive towards staff. Darnell Quinones called.  Threatening & saying to the RN that "I would kill you" & saying that he does not care about himself or anything.    HPI:  66 y/o M from home, with PMHx HTN, HLD, DM, and CKD who presents with abdominal pain. Admitted for intractable abdominal pain. CT A/P - Gallstones. Mild gallbladder wall thickening noted. Correlate for acute cholecystitis. RUQ ultrasound - Gallstones without sonographic evidence of acute cholecystitis. For  EUS/EGD on Friday 7/21. GI following     SUBJECTIVE: "I don't care about myself, I want to drink some hot water but I was given dirty water instead"     OBJECTIVE:  Vital Signs Last 24 Hrs  T(C): 36.8 (20 Jul 2023 20:30), Max: 37.1 (20 Jul 2023 12:29)  T(F): 98.3 (20 Jul 2023 20:30), Max: 98.8 (20 Jul 2023 12:29)  HR: 61 (20 Jul 2023 20:30) (55 - 61)  BP: 116/62 (20 Jul 2023 20:30) (116/62 - 148/73)  BP(mean): --  RR: 16 (20 Jul 2023 20:30) (16 - 18)  SpO2: 96% (20 Jul 2023 20:30) (95% - 96%)    Parameters below as of 20 Jul 2023 20:30  Patient On (Oxygen Delivery Method): room air      FOCUSED PHYSICAL EXAM:  Neuro: awake, alert, Very agitated & abusive  Cardiovascular: Pulses +2 B/L in lower and upper extremities, HR regular, BP stable, No edema.  Respiratory: Respirations regular, unlabored, breath sounds clear B/L.   GI: Abdomen soft, non-tender, positive bowel sounds.  : no bladder distention noted. No complaints at this time.  Skin: Dry, intact, no bruising, no diaphoresis.    LABS:                        13.9   5.50  )-----------( 181      ( 20 Jul 2023 05:48 )             43.4     07-20    139  |  112<H>  |  19<H>  ----------------------------<  129<H>  3.7   |  24  |  1.59<H>    Ca    8.9      20 Jul 2023 05:48    TPro  6.9  /  Alb  3.6  /  TBili  0.6  /  DBili  x   /  AST  16  /  ALT  24  /  AlkPhos  51  07-20      EKG:   IMAGING:    ASSESSMENT/ PROBLEM: Agitation      PLAN:   1. Code Quinones/hospital security in the hallway speaking to pt but pt still agitated & screaming  2. Haldol 5 mg, IM x 1 dose ordered  3. Monitor response to treatment  4. Cont present care/treatment  5. Supportive care    ADDENDUM: Pt is threatening to jump out through the window if hospital police does not leave the unit. Pt was placed on 1:1 for suicidal ideation.

## 2023-07-21 NOTE — PROGRESS NOTE ADULT - ASSESSMENT
seen and examined vsstable afebrile  events noted  pt was upset on one of staff member early am  later when GI came to explain pt about EGD and EUS then pt refused for procedure as he was upset then NP called me  i called Pt and explained importance of procedure, he became agree then I called GI for procedure. but as per GI is late not today  I requested  but not today.  pt denies abd pain  no nausea or vomiting  nbm ok   lungs heart ok   abd soft bs nml  non tender except minimal tenderness on deep palption of epigastri area   labs noted  craet 1.68   a/p egd /eus out pt   advance diet if tolerates then D/C home    pt has h/o depression but not suicidal  advised to see out pt   dm ckd htn hld out pt follow up

## 2023-07-21 NOTE — DISCHARGE NOTE PROVIDER - HOSPITAL COURSE
This is a 66 y/o M from home, with PMHx HTN, HLD, DM, and CKD who presents with abdominal pain. Pt states pain started 3 days ago and is located throughout the entire abdomen. Pt states pain sometimes radiates to the right shoulder area and he also feels a burning sensation in his epigastric area. Pt states pain is worse with eating and has been constant for the past 3 days. Pt endorses some nausea and fevers at home. Last colonoscopy in 2008 was normal. No prior endoscopies. Admitted to medicine for abodminal pain    Underwent mRCP, IMPRESSION:  9 mm enhancing lesion in the pancreatic tail adjacent to the spleen, better seen on the previous CT. This may represent an intrapancreatic splenule, or a nonspecific pancreatic mass lesion. If clinically indicated, sulfur colloid scintigraphy with SPECT may be pursued for further differentiation. Depending on the result of the sulfur colloid scintigraphy, endoscopic ultrasound may be needed for further workup.  Gallstones in the underdistended gallbladder.  Evaluated and followed by GI, scheduled for EGD/EUS however patient refused procedure.   diet advanced and patient tolerated well   patient to follow up outpatient with GI for further Work up and treatment.    This is a 68 y/o M from home, with PMHx HTN, HLD, DM, and CKD who presents with abdominal pain. Pt states pain started 3 days ago and is located throughout the entire abdomen. Pt states pain sometimes radiates to the right shoulder area and he also feels a burning sensation in his epigastric area. Pt states pain is worse with eating and has been constant for the past 3 days. Pt endorses some nausea and fevers at home. Last colonoscopy in 2008 was normal. No prior endoscopies. Admitted to medicine for abdominal pain    Underwent mRCP, IMPRESSION:  9 mm enhancing lesion in the pancreatic tail adjacent to the spleen, better seen on the previous CT. This may represent an intrapancreatic splenule, or a nonspecific pancreatic mass lesion. If clinically indicated, sulfur colloid scintigraphy with SPECT may be pursued for further differentiation. Depending on the result of the sulfur colloid scintigraphy, endoscopic ultrasound may be needed for further workup.  Gallstones in the underdistended gallbladder.  Evaluated and followed by GI, scheduled for EGD/EUS however patient refused procedure.   diet advanced and patient tolerated well, able to eat sandwich without pain,   patient to follow up outpatient with GI for further Work up and treatment.

## 2023-07-21 NOTE — PROGRESS NOTE ADULT - SUBJECTIVE AND OBJECTIVE BOX
HPI:  This is a 66 y/o M from home, with PMHx HTN, HLD, DM, and CKD who presents with abdominal pain. Pt states pain started 3 days ago and is located throughout the entire abdomen. Pt states pain sometimes radiates to the right shoulder area and he also feels a burning sensation in his epigastric area. Pt states pain is worse with eating and has been constant for the past 3 days. Pt endorses some nausea and fevers at home. Pt also states he has been drooling more at night time. Pt adds that he recently stopped taking carbidopa and levodopa after several years of taking it, due to his neurologist, Dr. Mcconnell recommending he stop. Last colonoscopy in 2008 was normal. No prior endoscopies. Pt denies any new foods, headaches, dizziness, CP, SOB, diarrhea, constipation, numbness or tingling.  (17 Jul 2023 23:31)      Patient is a 67y old  Male who presents with a chief complaint of intractable abdominal pain (21 Jul 2023 10:42)      INTERVAL HPI/OVERNIGHT EVENTS:  T(C): 36.9 (07-21-23 @ 11:39), Max: 37 (07-21-23 @ 05:25)  HR: 62 (07-21-23 @ 11:39) (55 - 66)  BP: 141/80 (07-21-23 @ 11:39) (116/62 - 166/88)  RR: 17 (07-21-23 @ 11:39) (16 - 17)  SpO2: 92% (07-21-23 @ 11:39) (92% - 96%)  Wt(kg): --  I&O's Summary      REVIEW OF SYSTEMS: denies fever, chills, SOB, palpitations, chest pain, abdominal pain, nausea, vomitting, diarrhea, constipation, dizziness    MEDICATIONS  (STANDING):  amLODIPine   Tablet 5 milliGRAM(s) Oral daily  aspirin  chewable 81 milliGRAM(s) Oral daily  fenofibrate Tablet 48 milliGRAM(s) Oral daily  gabapentin 100 milliGRAM(s) Oral three times a day  heparin   Injectable 5000 Unit(s) SubCutaneous every 12 hours  insulin lispro (ADMELOG) corrective regimen sliding scale   SubCutaneous three times a day before meals  insulin lispro (ADMELOG) corrective regimen sliding scale   SubCutaneous at bedtime  labetalol 100 milliGRAM(s) Oral every 8 hours  lisinopril 20 milliGRAM(s) Oral daily  pantoprazole    Tablet 40 milliGRAM(s) Oral two times a day  polyethylene glycol 3350 17 Gram(s) Oral daily  saline laxative (FLEET) Rectal Enema 1 Enema Rectal once  simvastatin 20 milliGRAM(s) Oral at bedtime  sodium chloride 0.9%. 1000 milliLiter(s) (100 mL/Hr) IV Continuous <Continuous>  sucralfate 1 Gram(s) Oral four times a day    MEDICATIONS  (PRN):  acetaminophen     Tablet .. 650 milliGRAM(s) Oral every 6 hours PRN Temp greater or equal to 38C (100.4F), Mild Pain (1 - 3), Moderate Pain (4 - 6)  aluminum hydroxide/magnesium hydroxide/simethicone Suspension 30 milliLiter(s) Oral every 4 hours PRN Dyspepsia  ondansetron Injectable 4 milliGRAM(s) IV Push every 8 hours PRN Nausea and/or Vomiting      PHYSICAL EXAM:  GENERAL: NAD, well-groomed, well-developed  HEAD:  Atraumatic, Normocephalic  EYES: EOMI, PERRLA, conjunctiva and sclera clear  ENMT: No tonsillar erythema, exudates, or enlargement; Moist mucous membranes, Good dentition, No lesions  NECK: Supple, No JVD, Normal thyroid  NERVOUS SYSTEM:  Alert & Oriented X3, Good concentration; Motor Strength 5/5 B/L upper and lower extremities; DTRs 2+ intact and symmetric  CHEST/LUNG: Clear to percussion bilaterally; No rales, rhonchi, wheezing, or rubs  HEART: Regular rate and rhythm; No murmurs, rubs, or gallops  ABDOMEN: Soft, Nontender, Nondistended; Bowel sounds present  EXTREMITIES:  2+ Peripheral Pulses, No clubbing, cyanosis, or edema  LYMPH: No lymphadenopathy noted  SKIN: No rashes or lesions  LABS:                        15.5   6.19  )-----------( 196      ( 21 Jul 2023 07:12 )             48.1     07-21    138  |  108  |  20<H>  ----------------------------<  176<H>  4.0   |  20<L>  |  1.68<H>    Ca    9.7      21 Jul 2023 07:12    TPro  7.8  /  Alb  3.9  /  TBili  0.9  /  DBili  x   /  AST  43<H>  /  ALT  40  /  AlkPhos  55  07-21    PT/INR - ( 21 Jul 2023 07:12 )   PT: 12.0 sec;   INR: 1.01 ratio         PTT - ( 21 Jul 2023 07:12 )  PTT:35.0 sec  Urinalysis Basic - ( 21 Jul 2023 07:12 )    Color: x / Appearance: x / SG: x / pH: x  Gluc: 176 mg/dL / Ketone: x  / Bili: x / Urobili: x   Blood: x / Protein: x / Nitrite: x   Leuk Esterase: x / RBC: x / WBC x   Sq Epi: x / Non Sq Epi: x / Bacteria: x      CAPILLARY BLOOD GLUCOSE      POCT Blood Glucose.: 107 mg/dL (21 Jul 2023 11:31)  POCT Blood Glucose.: 160 mg/dL (21 Jul 2023 06:33)  POCT Blood Glucose.: 159 mg/dL (21 Jul 2023 06:07)  POCT Blood Glucose.: 141 mg/dL (21 Jul 2023 00:02)  POCT Blood Glucose.: 165 mg/dL (20 Jul 2023 21:37)  POCT Blood Glucose.: 223 mg/dL (20 Jul 2023 16:29)        Urinalysis Basic - ( 21 Jul 2023 07:12 )    Color: x / Appearance: x / SG: x / pH: x  Gluc: 176 mg/dL / Ketone: x  / Bili: x / Urobili: x   Blood: x / Protein: x / Nitrite: x   Leuk Esterase: x / RBC: x / WBC x   Sq Epi: x / Non Sq Epi: x / Bacteria: x

## 2023-07-21 NOTE — PROGRESS NOTE ADULT - ASSESSMENT
66 y/o M from home, with PMHx HTN, HLD, DM, and CKD who presents with abdominal pain. Admitted for intractable abdominal pain. Pending MRCP. Plan for EUS/EGD today 7/21. Spoke with patient at length regarding overnight events. patient states he became frustrated and was yelling with staff after he wasn't able to get clean hot water in a timely manner. denies any SI/HI, or wanting to hurt staff. spoke with attending regarding events and constant observation d/c'd. continues to have abd/epigastric pain, unchanged from admission.   66 y/o M from home, with PMHx HTN, HLD, DM, and CKD who presents with abdominal pain. Admitted for intractable abdominal pain. Pending MRCP. Plan for EUS/EGD today 7/21. Spoke with patient at length regarding overnight events. patient states he became frustrated and was yelling with staff after he wasn't able to get clean hot water in a timely manner. denies any SI/HI, or wanting to hurt staff. spoke with attending regarding events and constant observation d/c'd. patient upset regarding events overnight and refusing to have EGD/EUS done. seen by GI and test canceled, patient to follow up outpatient with GI for furhter work up. ambulatory in room without assistance. will advance diet, if patient tolerates, will d.c to home

## 2023-07-21 NOTE — PROGRESS NOTE ADULT - REASON FOR ADMISSION
intractable abdominal pain

## 2023-07-21 NOTE — PROGRESS NOTE ADULT - PROBLEM SELECTOR PLAN 4
Pt has a history of HTN, takes fosinopril 20mg, Labetalol 100mg TID, and amlodipine at home  - C/w home meds with holding parameters
a1c 8.9   holding home dose glimepriide, januvia jarivy regpaglinide   - C/w ISS  FS ACHS
Pt has a history of HTN, takes fosinopril 20mg, Labetalol 100mg TID, and amlodipine at home  - C/w home meds with holding parameters
Pt has a history of HTN, takes fosinopril 20mg, Labetalol 100mg TID, and amlodipine at home  - C/w home meds with holding parameters

## 2023-07-21 NOTE — PROGRESS NOTE ADULT - PROBLEM SELECTOR PLAN 3
Pt has a history of DM, takes glimepiride 1mg, januvia 100mg, Jardiance 10mg, repaglinide 2mg at home  - C/w ISS  FS ACHS
Pt has a history of CKD  baseline Cr. 1.8  Pt currently at baseline  Avoid nephrotoxic agents
Pt has a history of DM, takes glimepriide 1mg, januvia 100mg, Jardiance 10mg, repaglinide 2mg at home  - C/w ISS  FS ACHS
Pt has a history of DM, takes glimepriide 1mg, januvia 100mg, Jardiance 10mg, repaglinide 2mg at home  - C/w ISS  FS ACHS

## 2023-07-21 NOTE — PROGRESS NOTE ADULT - PROBLEM SELECTOR PLAN 2
Incidental finding of Nonspecific 9 mm focus of nodular enhancement within the distal pancreatic tail region  plan for MRCP with and without contrast to further eval   GI follows
Pt has a history of CKD, baseline Cr. 1.8  Pt currently at baseline  Avoid nephrotoxic agents

## 2023-07-21 NOTE — CHART NOTE - NSCHARTNOTEFT_GEN_A_CORE
GI note    Patient was scheduled for EGD/EUS today but is refusing because the way he was treated by floor staff. He wants to call 911 and report. He is refusing endoscopic procedure.   Outpatient follow up with private GI doctor for further care. GI note    Endoscopy called for patient for EUS however pt refused. Spoke with patient at length bedside, discussed due to endo availability, would need to do EUS now or would have to wait til Monday or outpatient. He adamantly refused the procedure and requested to call 911. Support provided. Team at bedside. In light of patient's refusal, GI to sign off reconsult PRN.

## 2023-07-21 NOTE — DISCHARGE NOTE NURSING/CASE MANAGEMENT/SOCIAL WORK - NSDCPEFALRISK_GEN_ALL_CORE
For information on Fall & Injury Prevention, visit: [email or web address] OR  [email or web address] OR  https://www.cdc.gov/steadi/patient.html

## 2023-07-21 NOTE — DISCHARGE NOTE PROVIDER - CARE PROVIDER_API CALL
Thuan Bell  Internal Medicine  [address] [facility], [address]  [place] [address]  Phone: [phone]  Fax: [phone]  Established Patient  Follow Up Time: 1 week    Karolina Aguilar  Gastroenterology  [address] [place] [address]  Phone: [phone]  Fax: [phone]  Follow Up Time: 1 week

## 2023-07-21 NOTE — PROGRESS NOTE ADULT - PROVIDER SPECIALTY LIST ADULT
Internal Medicine
Gastroenterology
Internal Medicine
Surgery
Internal Medicine
Gastroenterology
Surgery
Internal Medicine

## 2023-07-21 NOTE — PROGRESS NOTE ADULT - PROBLEM SELECTOR PLAN 5
Pt has a history of HLD, takes simvastatin at home  - C/w statin
controlled   -130's   continue home dose fosinopril labetalol and amlodipine with holding parameters  monitor bp
Pt has a history of HLD, takes simvastatin at home  - C/w statin
Pt has a history of HLD, takes simvastatin at home  - C/w statin

## 2023-07-21 NOTE — DISCHARGE NOTE PROVIDER - NSDCCPCAREPLAN_GEN_ALL_CORE_FT
PRINCIPAL DISCHARGE DIAGNOSIS  Diagnosis: Abdominal pain  Assessment and Plan of Treatment: you presented with abdominal pain. you were evaluated by the GI team and underwent an MRCP   mRCP showed: Gallstones in the underdistended gallbladder and a 9 mm enhancing lesion in the pancreatic tail adjacent to the spleen. you were scheduled for EUS/EGD, however declined the procedure. please follow up with your GI outpatient for further evaluation and treatment.   please take your medications as prescribed      SECONDARY DISCHARGE DIAGNOSES  Diagnosis: Cholelithiases  Assessment and Plan of Treatment: MRCP showed gallstone in the gallbladder.   Please follow up with GI outpatient for further workup and treatment  continue to take your medications as prescirbed     PRINCIPAL DISCHARGE DIAGNOSIS  Diagnosis: Abdominal pain  Assessment and Plan of Treatment: you presented with abdominal pain. you were evaluated by the GI team and underwent an MRCP   mRCP showed: Gallstones in the underdistended gallbladder and a 9 mm enhancing lesion in the pancreatic tail adjacent to the spleen. you were scheduled for EUS/EGD, however declined the procedure. please follow up with your GI outpatient for further evaluation and treatment.   please take your medications as prescribed      SECONDARY DISCHARGE DIAGNOSES  Diagnosis: Cholelithiases  Assessment and Plan of Treatment: MRCP showed gallstone in the gallbladder.   Please follow up with GI outpatient for further workup and treatment  continue to take your medications as prescirbed  Seek care immediately if:  You have a fever and chills.  Your skin or eyes turn yellow.  You have severe pain in your upper abdomen, just below the right ribcage.  Call your doctor or gastroenterologist if:  You have nausea and are vomiting.  Your urine is dark.  You have abby-colored bowel movements.  You have questions or concerns about your condition or care.  Gallstones are hard substances that form in your gallbladder or bile duct. Your gallbladder and bile duct are located on the right side of your abdomen, near your liver. Your gallbladder stores bile. Bile helps break down the fat that you eat. Your gallbladder also helps remove certain chemicals from your body.    Diagnosis: Chronic kidney disease (CKD)  Assessment and Plan of Treatment: you presented with elevated kidney function, with Creatinine 1.8. this is  your baseline creatitine.   follow up with your pcp outpatient    Diagnosis: HTN (hypertension)  Assessment and Plan of Treatment: Follow up with your medical doctor to establish long term blood pressure treatment goals.   Low salt diet  Activity as tolerated.  Take all medication as prescribed.  Follow up with your medical doctor for routine blood pressure monitoring at your next visit.  Notify your doctor if you have any of the following symptoms:   Dizziness, Lightheadedness, Blurry vision, Headache, Chest pain, Shortness of breath      Diagnosis: HLD (hyperlipidemia)  Assessment and Plan of Treatment:

## 2023-07-21 NOTE — PROGRESS NOTE ADULT - PROBLEM SELECTOR PLAN 1
Pt presents with epigastric/abdominal pain with nausea for the past 3 days, exacerbated by food.  CT A/P - Gallstones. Mild gallbladder wall thickening noted. Correlate for acute cholecystitis.  RUQ ultrasound - Gallstones without sonographic evidence of acute cholecystitis  - C/w Pain control - severe pain morphine q6 hrs  - C/w PPI  - Surgery Following - no acute intervention  - s/p MRCP  - NPO after [place] for EGD/ EUS today    - GI Dr Carlos Pt presents with epigastric/abdominal pain with nausea for the past 3 days, exacerbated by food.  CT A/P - Gallstones. Mild gallbladder wall thickening noted. Correlate for acute cholecystitis.  RUQ ultrasound - Gallstones without sonographic evidence of acute cholecystitis  - C/w Pain control - severe pain morphine q6 hrs  - C/w PPI  - Surgery Following - no acute intervention  - s/p MRCP  - GI Dr Carlos

## 2023-07-21 NOTE — DISCHARGE NOTE NURSING/CASE MANAGEMENT/SOCIAL WORK - PATIENT PORTAL LINK FT
You can access the FollowMyHealth Patient Portal offered by [facility] by registering at the following website: [email or web address]. By joining [employer]’s FollowMyHealth portal, you will also be able to view your health information using other applications (apps) compatible with our system.

## 2023-11-06 NOTE — PATIENT PROFILE ADULT - INTERNATIONAL TRAVEL
Bedside report received from day RN, pt care assumed, assessment completed. Pt is A&O to self only, pain 0/10, SR on the monitor. Updated on POC, questions answered. Bed in lowest, locked position, treaded socks on, call light and belongings within reach.      No

## 2023-11-20 ENCOUNTER — RX RENEWAL (OUTPATIENT)
Age: 68
End: 2023-11-20

## 2023-11-20 RX ORDER — TOPIRAMATE 25 MG/1
25 TABLET, FILM COATED ORAL TWICE DAILY
Qty: 90 | Refills: 2 | Status: ACTIVE | COMMUNITY
Start: 2023-06-19 | End: 1900-01-01

## 2024-06-16 ENCOUNTER — NON-APPOINTMENT (OUTPATIENT)
Age: 69
End: 2024-06-16

## 2024-06-17 ENCOUNTER — APPOINTMENT (OUTPATIENT)
Dept: NEUROLOGY | Facility: CLINIC | Age: 69
End: 2024-06-17
Payer: MEDICARE

## 2024-06-17 VITALS
TEMPERATURE: 97.3 F | OXYGEN SATURATION: 96 % | SYSTOLIC BLOOD PRESSURE: 93 MMHG | WEIGHT: 195 LBS | HEIGHT: 70 IN | BODY MASS INDEX: 27.92 KG/M2 | DIASTOLIC BLOOD PRESSURE: 55 MMHG | HEART RATE: 86 BPM

## 2024-06-17 PROBLEM — N18.9 CHRONIC KIDNEY DISEASE, UNSPECIFIED: Chronic | Status: ACTIVE | Noted: 2023-07-18

## 2024-06-17 PROCEDURE — 99212 OFFICE O/P EST SF 10 MIN: CPT

## 2024-06-17 RX ORDER — PRIMIDONE 50 MG/1
50 TABLET ORAL TWICE DAILY
Qty: 360 | Refills: 3 | Status: DISCONTINUED | COMMUNITY
Start: 2020-12-22 | End: 2024-06-17

## 2024-06-17 RX ORDER — PRIMIDONE 50 MG/1
50 TABLET ORAL TWICE DAILY
Qty: 360 | Refills: 3 | Status: ACTIVE | COMMUNITY
Start: 2023-06-19 | End: 1900-01-01

## 2024-06-17 RX ORDER — PRIMIDONE 50 MG/1
50 TABLET ORAL TWICE DAILY
Qty: 360 | Refills: 3 | Status: DISCONTINUED | COMMUNITY
Start: 2021-03-22 | End: 2024-06-17

## 2024-06-17 RX ORDER — PRIMIDONE 50 MG/1
50 TABLET ORAL
Qty: 90 | Refills: 3 | Status: DISCONTINUED | COMMUNITY
Start: 2020-11-19 | End: 2024-06-17

## 2024-06-17 NOTE — DISCUSSION/SUMMARY
[FreeTextEntry1] : Essential Tremor controlled; He was wondering if there is a contraindication to orthopedic procedures . There is no contraindication to orthopedic procedures.

## 2024-06-17 NOTE — PHYSICAL EXAM
[Person] : oriented to person [Place] : oriented to place [Time] : oriented to time [Short Term Intact] : short term memory intact [Remote Intact] : remote memory intact [Span Intact] : the attention span was normal [Concentration Intact] : normal concentrating ability [Visual Intact] : visual attention was ~T not ~L decreased [Naming Objects] : no difficulty naming common objects [Repeating Phrases] : no difficulty repeating a phrase [Writing A Sentence] : no difficulty writing a sentence [Fluency] : fluency intact [Comprehension] : comprehension intact [Reading] : reading intact [Cranial Nerves Optic (II)] : visual acuity intact bilaterally,  visual fields full to confrontation, pupils equal round and reactive to light [Cranial Nerves Oculomotor (III)] : extraocular motion intact [Cranial Nerves Trigeminal (V)] : facial sensation intact symmetrically [Cranial Nerves Facial (VII)] : face symmetrical [Cranial Nerves Vestibulocochlear (VIII)] : hearing was intact bilaterally [Cranial Nerves Glossopharyngeal (IX)] : tongue and palate midline [Cranial Nerves Accessory (XI - Cranial And Spinal)] : head turning and shoulder shrug symmetric [Motor Strength] : muscle strength was normal in all four extremities [Motor Strength Upper Extremities Bilaterally] : strength was normal in both upper extremities [Motor Strength Lower Extremities Bilaterally] : strength was normal in both lower extremities [Sensation Tactile Decrease] : light touch was intact [Vibration Decrease Leg / Foot Both Ankles] : decreased at both ankles [Vibration Decrease Leg / Foot Toes Both Feet] : decreased at the toes of both feet  [Limited Balance] : the patient's balance was impaired [Past-pointing] : there was no past-pointing [Dysdiadochokinesia Bilaterally] : not present [Coordination - Dysmetria Impaired Finger-to-Nose Bilateral] : not present [Coordination - Dysmetria Impaired Heel-to-Shin Bilateral] : not present [2+] : Brachioradialis left 2+ [1+] : Ankle jerk left 1+ [Plantar Reflex Right Only] : normal on the right [Plantar Reflex Left Only] : normal on the left [___] : absent on the right [___] : absent on the left [Primitive Reflexes] : primitive reflexes were present [FreeTextEntry1] : Resting tremor and dynamic /static slow rhythmic tremor left> right hand with stooped gait and reduced swing of the left hand [Sclera] : the sclera and conjunctiva were normal [PERRL With Normal Accommodation] : pupils were equal in size, round, reactive to light, with normal accommodation [Extraocular Movements] : extraocular movements were intact [Neck Appearance] : the appearance of the neck was normal [Neck Cervical Mass (___cm)] : no neck mass was observed [Jugular Venous Distention Increased] : there was no jugular-venous distention [Thyroid Diffuse Enlargement] : the thyroid was not enlarged [Thyroid Nodule] : there were no palpable thyroid nodules [Auscultation Breath Sounds / Voice Sounds] : lungs were clear to auscultation bilaterally [Heart Rate And Rhythm] : heart rate was normal and rhythm regular [Heart Sounds] : normal S1 and S2 [Heart Sounds Gallop] : no gallops [Murmurs] : no murmurs [Heart Sounds Pericardial Friction Rub] : no pericardial rub [Full Pulse] : the pedal pulses are present [Edema] : there was no peripheral edema [Bowel Sounds] : normal bowel sounds [Abdomen Soft] : soft [Abdomen Tenderness] : non-tender [] : no hepato-splenomegaly [Abdomen Mass (___ Cm)] : no abdominal mass palpated [No CVA Tenderness] : no ~M costovertebral angle tenderness [No Spinal Tenderness] : no spinal tenderness [Abnormal Walk] : normal gait [Nail Clubbing] : no clubbing  or cyanosis of the fingernails [Musculoskeletal - Swelling] : no joint swelling seen [Motor Tone] : muscle strength and tone were normal

## 2025-03-06 NOTE — ED ADULT NURSE NOTE - ED STAT RN HANDOFF TIME
Pt daughter called in stating that pt has been doing PT for the past several years. It was ordered under his other PCP and now they are being recommended to take a 3 month break. Pt daughter is not comfortable with that because in the past anytime there was a break in PT, pt fell. She is hoping the PT can be renewed. Pt daughter would appreciate a phone call to be able to discuss the situation further with PCP. Please advise.    23:00

## 2025-03-17 ENCOUNTER — APPOINTMENT (OUTPATIENT)
Dept: NEUROLOGY | Facility: CLINIC | Age: 70
End: 2025-03-17
Payer: MEDICARE

## 2025-03-17 ENCOUNTER — NON-APPOINTMENT (OUTPATIENT)
Age: 70
End: 2025-03-17

## 2025-03-17 VITALS
BODY MASS INDEX: 27.49 KG/M2 | TEMPERATURE: 97.6 F | WEIGHT: 192 LBS | HEIGHT: 70 IN | HEART RATE: 80 BPM | DIASTOLIC BLOOD PRESSURE: 70 MMHG | SYSTOLIC BLOOD PRESSURE: 112 MMHG | OXYGEN SATURATION: 97 %

## 2025-03-17 DIAGNOSIS — M48.10 ANKYLOSING HYPEROSTOSIS [FORESTIER], SITE UNSPECIFIED: ICD-10-CM

## 2025-03-17 PROCEDURE — 99215 OFFICE O/P EST HI 40 MIN: CPT

## 2025-03-22 ENCOUNTER — APPOINTMENT (OUTPATIENT)
Dept: MRI IMAGING | Facility: CLINIC | Age: 70
End: 2025-03-22
Payer: MEDICARE

## 2025-03-22 PROCEDURE — 72141 MRI NECK SPINE W/O DYE: CPT

## 2025-03-25 ENCOUNTER — NON-APPOINTMENT (OUTPATIENT)
Age: 70
End: 2025-03-25

## 2025-04-09 ENCOUNTER — APPOINTMENT (OUTPATIENT)
Dept: RADIOLOGY | Facility: CLINIC | Age: 70
End: 2025-04-09
Payer: MEDICARE

## 2025-04-09 ENCOUNTER — APPOINTMENT (OUTPATIENT)
Dept: NEUROSURGERY | Facility: CLINIC | Age: 70
End: 2025-04-09
Payer: MEDICARE

## 2025-04-09 VITALS
WEIGHT: 196 LBS | OXYGEN SATURATION: 97 % | HEIGHT: 70 IN | BODY MASS INDEX: 28.06 KG/M2 | SYSTOLIC BLOOD PRESSURE: 136 MMHG | DIASTOLIC BLOOD PRESSURE: 77 MMHG | TEMPERATURE: 97.8 F | HEART RATE: 54 BPM

## 2025-04-09 DIAGNOSIS — M54.2 CERVICALGIA: ICD-10-CM

## 2025-04-09 PROCEDURE — 99203 OFFICE O/P NEW LOW 30 MIN: CPT

## 2025-04-09 PROCEDURE — 72052 X-RAY EXAM NECK SPINE 6/>VWS: CPT

## (undated) DEVICE — DRSG CURITY GAUZE SPONGE 4 X 4" 12-PLY NON-STERILE

## (undated) DEVICE — CONTAINER FORMALIN 10% 20ML

## (undated) DEVICE — WARMING BLANKET FULL ADULT

## (undated) DEVICE — UNDERPAD LINEN SAVER 17 X 24"

## (undated) DEVICE — SOL IRR POUR NS 0.9% 500ML

## (undated) DEVICE — GOWN LG

## (undated) DEVICE — ANESTHESIA CIRCUIT ADULT HMEF

## (undated) DEVICE — SOL INJ NS 0.9% 500ML 1-PORT

## (undated) DEVICE — Device

## (undated) DEVICE — DENTURE CUP PINK

## (undated) DEVICE — TUBING MEDI-VAC W MAXIGRIP CONNECTORS 1/4"X6'

## (undated) DEVICE — LABELS BLANK W PEN

## (undated) DEVICE — CATH IV SAFE BC 22G X 1" (BLUE)

## (undated) DEVICE — SOLIDIFIER 1200CC

## (undated) DEVICE — FORMALIN CONTAINER MED

## (undated) DEVICE — DRSG 2X2

## (undated) DEVICE — BITE BLOCK ADULT 20 X 27MM (GREEN)

## (undated) DEVICE — VENODYNE/SCD SLEEVE CALF MEDIUM

## (undated) DEVICE — TUBING ENDO EXT OLYMPUS 160 24HR USE GI

## (undated) DEVICE — SALIVA EJECTOR (BLUE)

## (undated) DEVICE — NDL HYPO SAFE 22G X 1" (BLACK)

## (undated) DEVICE — PACK IV START WITH CHG

## (undated) DEVICE — SYR LUER LOK 3CC

## (undated) DEVICE — TUBING IV SET GRAVITY 1Y 78" MACRO